# Patient Record
Sex: FEMALE | Race: WHITE | NOT HISPANIC OR LATINO
[De-identification: names, ages, dates, MRNs, and addresses within clinical notes are randomized per-mention and may not be internally consistent; named-entity substitution may affect disease eponyms.]

---

## 2020-11-17 ENCOUNTER — APPOINTMENT (OUTPATIENT)
Dept: GASTROENTEROLOGY | Facility: CLINIC | Age: 75
End: 2020-11-17
Payer: MEDICARE

## 2020-11-17 VITALS
HEART RATE: 75 BPM | TEMPERATURE: 98.3 F | DIASTOLIC BLOOD PRESSURE: 95 MMHG | SYSTOLIC BLOOD PRESSURE: 134 MMHG | HEIGHT: 65 IN | BODY MASS INDEX: 24.66 KG/M2 | WEIGHT: 148 LBS

## 2020-11-17 DIAGNOSIS — Z12.11 ENCOUNTER FOR SCREENING FOR MALIGNANT NEOPLASM OF COLON: ICD-10-CM

## 2020-11-17 PROCEDURE — 99202 OFFICE O/P NEW SF 15 MIN: CPT

## 2020-11-17 NOTE — HISTORY OF PRESENT ILLNESS
[FreeTextEntry1] : Patient who is enjoyed good health had her last colonoscopyapproximately 10 years ago, here for followup screening. She denies any rectal bleeding or anemia. She denies any change in bowel habits, a family history of colon cancer

## 2020-11-17 NOTE — PHYSICAL EXAM
[General Appearance - Alert] : alert [General Appearance - In No Acute Distress] : in no acute distress [Sclera] : the sclera and conjunctiva were normal [PERRL With Normal Accommodation] : pupils were equal in size, round, and reactive to light [Extraocular Movements] : extraocular movements were intact [Outer Ear] : the ears and nose were normal in appearance [Oropharynx] : the oropharynx was normal [Neck Appearance] : the appearance of the neck was normal [Neck Cervical Mass (___cm)] : no neck mass was observed [Jugular Venous Distention Increased] : there was no jugular-venous distention [Thyroid Diffuse Enlargement] : the thyroid was not enlarged [Thyroid Nodule] : there were no palpable thyroid nodules [Auscultation Breath Sounds / Voice Sounds] : lungs were clear to auscultation bilaterally [Heart Rate And Rhythm] : heart rate was normal and rhythm regular [Heart Sounds] : normal S1 and S2 [Heart Sounds Gallop] : no gallops [Murmurs] : no murmurs [Heart Sounds Pericardial Friction Rub] : no pericardial rub [Bowel Sounds] : normal bowel sounds [Abdomen Soft] : soft [Abdomen Tenderness] : non-tender [Abdomen Mass (___ Cm)] : no abdominal mass palpated [Cervical Lymph Nodes Enlarged Posterior Bilaterally] : posterior cervical [Cervical Lymph Nodes Enlarged Anterior Bilaterally] : anterior cervical [Supraclavicular Lymph Nodes Enlarged Bilaterally] : supraclavicular [Axillary Lymph Nodes Enlarged Bilaterally] : axillary [Femoral Lymph Nodes Enlarged Bilaterally] : femoral [Inguinal Lymph Nodes Enlarged Bilaterally] : inguinal [No CVA Tenderness] : no ~M costovertebral angle tenderness [No Spinal Tenderness] : no spinal tenderness [Abnormal Walk] : normal gait [Nail Clubbing] : no clubbing  or cyanosis of the fingernails [Musculoskeletal - Swelling] : no joint swelling seen [Motor Tone] : muscle strength and tone were normal [Skin Color & Pigmentation] : normal skin color and pigmentation [Skin Turgor] : normal skin turgor [] : no rash [Deep Tendon Reflexes (DTR)] : deep tendon reflexes were 2+ and symmetric [Sensation] : the sensory exam was normal to light touch and pinprick [No Focal Deficits] : no focal deficits

## 2020-12-18 ENCOUNTER — APPOINTMENT (OUTPATIENT)
Dept: DISASTER EMERGENCY | Facility: CLINIC | Age: 75
End: 2020-12-18

## 2020-12-18 ENCOUNTER — LABORATORY RESULT (OUTPATIENT)
Age: 75
End: 2020-12-18

## 2020-12-21 ENCOUNTER — RESULT REVIEW (OUTPATIENT)
Age: 75
End: 2020-12-21

## 2020-12-21 ENCOUNTER — APPOINTMENT (OUTPATIENT)
Dept: GASTROENTEROLOGY | Facility: AMBULATORY MEDICAL SERVICES | Age: 75
End: 2020-12-21
Payer: MEDICARE

## 2020-12-21 PROCEDURE — 45385 COLONOSCOPY W/LESION REMOVAL: CPT

## 2020-12-21 PROCEDURE — 45380 COLONOSCOPY AND BIOPSY: CPT | Mod: 59

## 2021-05-26 DIAGNOSIS — Z82.0 FAMILY HISTORY OF EPILEPSY AND OTHER DISEASES OF THE NERVOUS SYSTEM: ICD-10-CM

## 2021-05-26 DIAGNOSIS — J30.2 OTHER SEASONAL ALLERGIC RHINITIS: ICD-10-CM

## 2021-05-26 DIAGNOSIS — Z92.89 PERSONAL HISTORY OF OTHER MEDICAL TREATMENT: ICD-10-CM

## 2021-05-26 DIAGNOSIS — K63.5 POLYP OF COLON: ICD-10-CM

## 2021-05-26 DIAGNOSIS — H49.02 THIRD [OCULOMOTOR] NERVE PALSY, LEFT EYE: ICD-10-CM

## 2021-05-26 DIAGNOSIS — B00.9 HERPESVIRAL INFECTION, UNSPECIFIED: ICD-10-CM

## 2021-05-26 RX ORDER — NITROFURANTOIN (MONOHYDRATE/MACROCRYSTALS) 25; 75 MG/1; MG/1
100 CAPSULE ORAL
Qty: 60 | Refills: 0 | Status: DISCONTINUED | COMMUNITY
Start: 2020-09-11 | End: 2021-05-26

## 2021-07-18 PROBLEM — Z82.69 FAMILY HISTORY OF OSTEOARTHRITIS: Status: ACTIVE | Noted: 2021-07-18

## 2021-07-18 PROBLEM — M54.9 CHRONIC BACK PAIN: Status: ACTIVE | Noted: 2021-07-18

## 2021-07-18 PROBLEM — Z86.018 HISTORY OF INTRADUCTAL PAPILLOMA OF RIGHT BREAST: Status: RESOLVED | Noted: 2021-07-18 | Resolved: 2021-07-18

## 2021-07-18 PROBLEM — Z87.891 FORMER SMOKER: Status: ACTIVE | Noted: 2021-05-26

## 2021-07-18 PROBLEM — Z78.9 EXERCISES DAILY: Status: ACTIVE | Noted: 2021-05-26

## 2021-07-18 PROBLEM — Z78.9 REGULAR ALCOHOL CONSUMPTION: Status: ACTIVE | Noted: 2021-05-26

## 2021-07-18 RX ORDER — SODIUM SULFATE, POTASSIUM SULFATE, MAGNESIUM SULFATE 17.5; 3.13; 1.6 G/ML; G/ML; G/ML
17.5-3.13-1.6 SOLUTION, CONCENTRATE ORAL
Qty: 1 | Refills: 0 | Status: DISCONTINUED | COMMUNITY
Start: 2020-11-17 | End: 2021-07-18

## 2021-07-20 ENCOUNTER — APPOINTMENT (OUTPATIENT)
Dept: FAMILY MEDICINE | Facility: CLINIC | Age: 76
End: 2021-07-20
Payer: MEDICARE

## 2021-07-20 ENCOUNTER — LABORATORY RESULT (OUTPATIENT)
Age: 76
End: 2021-07-20

## 2021-07-20 VITALS
TEMPERATURE: 97.4 F | OXYGEN SATURATION: 98 % | HEIGHT: 65 IN | SYSTOLIC BLOOD PRESSURE: 136 MMHG | WEIGHT: 150 LBS | BODY MASS INDEX: 24.99 KG/M2 | HEART RATE: 74 BPM | DIASTOLIC BLOOD PRESSURE: 82 MMHG

## 2021-07-20 DIAGNOSIS — M54.9 DORSALGIA, UNSPECIFIED: ICD-10-CM

## 2021-07-20 DIAGNOSIS — Z78.9 OTHER SPECIFIED HEALTH STATUS: ICD-10-CM

## 2021-07-20 DIAGNOSIS — R03.0 ELEVATED BLOOD-PRESSURE READING, W/OUT DIAGNOSIS OF HYPERTENSION: ICD-10-CM

## 2021-07-20 DIAGNOSIS — Z82.69 FAMILY HISTORY OF OTHER DISEASES OF THE MUSCULOSKELETAL SYSTEM AND CONNECTIVE TISSUE: ICD-10-CM

## 2021-07-20 DIAGNOSIS — Z86.018 PERSONAL HISTORY OF OTHER BENIGN NEOPLASM: ICD-10-CM

## 2021-07-20 DIAGNOSIS — Z87.891 PERSONAL HISTORY OF NICOTINE DEPENDENCE: ICD-10-CM

## 2021-07-20 DIAGNOSIS — G89.29 DORSALGIA, UNSPECIFIED: ICD-10-CM

## 2021-07-20 PROCEDURE — G0438: CPT

## 2021-07-20 PROCEDURE — 36415 COLL VENOUS BLD VENIPUNCTURE: CPT

## 2021-07-20 NOTE — REVIEW OF SYSTEMS
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

## 2021-07-20 NOTE — PHYSICAL EXAM
[No Acute Distress] : no acute distress [Well Nourished] : well nourished [Well Developed] : well developed [Well-Appearing] : well-appearing [Normal Sclera/Conjunctiva] : normal sclera/conjunctiva [EOMI] : extraocular movements intact [No JVD] : no jugular venous distention [No Lymphadenopathy] : no lymphadenopathy [Supple] : supple [Thyroid Normal, No Nodules] : the thyroid was normal and there were no nodules present [No Respiratory Distress] : no respiratory distress  [No Accessory Muscle Use] : no accessory muscle use [Clear to Auscultation] : lungs were clear to auscultation bilaterally [Normal Rate] : normal rate  [Regular Rhythm] : with a regular rhythm [Normal S1, S2] : normal S1 and S2 [No Murmur] : no murmur heard [No Carotid Bruits] : no carotid bruits [No Varicosities] : no varicosities [Pedal Pulses Present] : the pedal pulses are present [No Edema] : there was no peripheral edema [No Extremity Clubbing/Cyanosis] : no extremity clubbing/cyanosis [Soft] : abdomen soft [Non Tender] : non-tender [Non-distended] : non-distended [No Masses] : no abdominal mass palpated [No HSM] : no HSM [Normal Bowel Sounds] : normal bowel sounds [Normal Posterior Cervical Nodes] : no posterior cervical lymphadenopathy [Normal Anterior Cervical Nodes] : no anterior cervical lymphadenopathy [No Joint Swelling] : no joint swelling [Grossly Normal Strength/Tone] : grossly normal strength/tone [No Rash] : no rash [Coordination Grossly Intact] : coordination grossly intact [No Focal Deficits] : no focal deficits [Normal Gait] : normal gait [Normal Affect] : the affect was normal [Normal Insight/Judgement] : insight and judgment were intact [de-identified] : slender frame [de-identified] : no s/sxs acute synovitis [de-identified] : +tanned skin with solar lentigos and freckles

## 2021-07-20 NOTE — HISTORY OF PRESENT ILLNESS
[de-identified] : Her last PE was 7/2020\par \par Her last tetanus shot was 2008\par Pneumovax, Prevnar-- has not had these vaccines\par Shingrix-- has not had this series\par Had COVID vaccine series\par \par Her last dentist visit was within past 6 months\par Her last eye doctor appointment was within past year-- will need cataract surgery in near future (Dr. Buitrago) \par Her last dermatologist visit was years ago and she defers on this for now as has no acute skin concerns. She will schedule if needed for any concerns/skin issues. \par \par Her diet is clean/healthful overall\par Exercises regularly (walking, yoga)\par \par Her last colonoscopy was 12/2020 polyp (Dr. Marroquin)\par Her last mammogram was 7/2020, has appt 8/2020\par Her last DEXA was in 2018, has appt 8/2020\par Her last gyn exam was 7/2020 (Dr. Espinosa), has appt 8/2020 \par \par She notes that she still gets hot flashes (milder and more tolerable) even with use of HT at this dose so she does not feel she should.can try to lower dose any further. We revd r/b/se HT and I recommended she work to wean down/off at this point in her life. She declines for now but says she d/w gyn periodically and will cont to do so \par \par She has chronic back pain due to DJD and herniated discs and also other OA related joint pain and stiffness. She manages these sxs by staying physically active and doing yoga and stretching regularly. Does not need to take any meds for her OA. \par

## 2021-07-20 NOTE — PLAN
[FreeTextEntry1] : Reviewed age-appropriate preventive screening tests with patient. UTD on colonoscopy and has gyn, mammo, DEXA exams schedule for 8/2021\par \par Revd/recommended Shingrix, Tdap or Td, Prevnar, Pneumovax. She is considering Shingrix in near future. \par \par Cont clean eating and regular exercise/staying as physically active as possible.\par \par Reviewed importance of good self care (eg meditation, yoga, adequate rest, regular exercise, magnesium, clean eating etc).\par \par Check labs today. She defers on ECG for today. BP mildly elevated in office today which she attributes to having to wear mask and being around others wearing masks as these factors tend to elicit feelings of claustrophobia for her. Her BPs are usually excellent and she can check occas at home and let me know if home BP is elevated. She will cont her healthful lifestyle.\par \par Next PE in 1-2 yrs.

## 2021-07-21 ENCOUNTER — NON-APPOINTMENT (OUTPATIENT)
Age: 76
End: 2021-07-21

## 2021-07-21 LAB
ALBUMIN SERPL ELPH-MCNC: 4.6 G/DL
ALP BLD-CCNC: 82 U/L
ALT SERPL-CCNC: 13 U/L
ANION GAP SERPL CALC-SCNC: 13 MMOL/L
AST SERPL-CCNC: 17 U/L
BILIRUB SERPL-MCNC: 0.3 MG/DL
BUN SERPL-MCNC: 15 MG/DL
CALCIUM SERPL-MCNC: 9.8 MG/DL
CHLORIDE SERPL-SCNC: 103 MMOL/L
CHOLEST SERPL-MCNC: 184 MG/DL
CO2 SERPL-SCNC: 24 MMOL/L
CREAT SERPL-MCNC: 0.69 MG/DL
ESTIMATED AVERAGE GLUCOSE: 103 MG/DL
GLUCOSE SERPL-MCNC: 90 MG/DL
HBA1C MFR BLD HPLC: 5.2 %
HDLC SERPL-MCNC: 48 MG/DL
LDLC SERPL CALC-MCNC: 107 MG/DL
NONHDLC SERPL-MCNC: 135 MG/DL
POTASSIUM SERPL-SCNC: 4.8 MMOL/L
PROT SERPL-MCNC: 6.8 G/DL
SODIUM SERPL-SCNC: 141 MMOL/L
TRIGL SERPL-MCNC: 143 MG/DL
TSH SERPL-ACNC: 5.06 UIU/ML

## 2022-01-12 ENCOUNTER — APPOINTMENT (OUTPATIENT)
Dept: FAMILY MEDICINE | Facility: CLINIC | Age: 77
End: 2022-01-12
Payer: MEDICARE

## 2022-01-12 VITALS
WEIGHT: 151 LBS | SYSTOLIC BLOOD PRESSURE: 118 MMHG | HEART RATE: 84 BPM | HEIGHT: 65 IN | TEMPERATURE: 96.8 F | OXYGEN SATURATION: 97 % | DIASTOLIC BLOOD PRESSURE: 70 MMHG | BODY MASS INDEX: 25.16 KG/M2

## 2022-01-12 PROCEDURE — 36415 COLL VENOUS BLD VENIPUNCTURE: CPT

## 2022-01-12 PROCEDURE — 99213 OFFICE O/P EST LOW 20 MIN: CPT | Mod: 25

## 2022-01-12 NOTE — HISTORY OF PRESENT ILLNESS
[FreeTextEntry1] : MALISSA MURCIA is a 76 year old female here for a follow up visit.\par  [de-identified] : Amy is here to f/u on abnormal thyroid levels in hypothyroidism ranges noted at her 7/2021 CPE visit (TSH 5.06, FT4 0.8). HDL was also borderline low at 48 at that time. \par \par She was offered A trial of low dose levothyroxine vs. continue clean eating and increase exercise levels and recheck TFTs. She opted to  defer on meds and is here for recheck of labs. She feels well and denies s/sxs hypothyroidism-- she has trouble losing wgt but that has been since menopause and wgt is overall stable over past few years. Feels well in general. No FH thyroid disease\par \par She notes that she still gets hot flashes (milder and more tolerable) even with use of HT at this dose so she does not feel she should.can try to lower dose any further. We revd r/b/se HT and I recommended she work to wean down/off at this point in her life. She declines for now but says she d/w gyn periodically and will cont to do so \par \par She has chronic back pain due to DJD and herniated discs and also other OA related joint pain and stiffness. She manages these sxs by staying physically active and doing yoga and stretching regularly. Does not need to take any meds for her OA. \par \par Her diet is clean/healthful overall. Walking/doing yoga regularly\par \par Had COVID booster dose. Prefers to not get flu vacc

## 2022-01-12 NOTE — PLAN
[FreeTextEntry1] : Check labs today (she prefers to stick with TSH and FT4 labs today which is reasonable). Revd prior thyroid results from 7/2021 and r/b/se levothyroxine and risks of untreated hypothyroidism and I suggested that if her labs are again in hypothyroid range she should strongly consider trial of thyroid medication replacement. If labs are improved to normal (or even subclinical hypothyroid range) then no need for thyroid med at this time. \par \par Revd/recommended Shingrix, Tdap or Td, Prevnar, Pneumovax. She is considering Shingrix in near future. \par \par Cont clean eating and regular exercise/staying as physically active as possible.\par \par Reviewed importance of good self care (eg meditation, yoga, adequate rest, regular exercise, magnesium, clean eating etc).\par \par BP was mildly elevated in office last visit but is wnl today and usually wnl. She will cont her healthful lifestyle.\par \par Next CPE due/scheduled in 7/2022

## 2022-01-12 NOTE — PHYSICAL EXAM
[No Acute Distress] : no acute distress [Well Nourished] : well nourished [Well-Appearing] : well-appearing [Well Developed] : well developed [Normal Sclera/Conjunctiva] : normal sclera/conjunctiva [EOMI] : extraocular movements intact [No JVD] : no jugular venous distention [No Lymphadenopathy] : no lymphadenopathy [Supple] : supple [Thyroid Normal, No Nodules] : the thyroid was normal and there were no nodules present [No Respiratory Distress] : no respiratory distress  [No Accessory Muscle Use] : no accessory muscle use [Clear to Auscultation] : lungs were clear to auscultation bilaterally [Normal Rate] : normal rate  [Regular Rhythm] : with a regular rhythm [Normal S1, S2] : normal S1 and S2 [No Murmur] : no murmur heard [Pedal Pulses Present] : the pedal pulses are present [No Edema] : there was no peripheral edema [No Extremity Clubbing/Cyanosis] : no extremity clubbing/cyanosis [Soft] : abdomen soft [Non Tender] : non-tender [Normal Bowel Sounds] : normal bowel sounds [Normal Posterior Cervical Nodes] : no posterior cervical lymphadenopathy [Normal Anterior Cervical Nodes] : no anterior cervical lymphadenopathy [No Joint Swelling] : no joint swelling [Grossly Normal Strength/Tone] : grossly normal strength/tone [No Rash] : no rash [Coordination Grossly Intact] : coordination grossly intact [No Focal Deficits] : no focal deficits [Normal Gait] : normal gait [Normal Affect] : the affect was normal [Normal Insight/Judgement] : insight and judgment were intact [de-identified] : no s/sxs acute synovitis [de-identified] : +tanned skin with solar lentigos and freckles

## 2022-01-12 NOTE — HEALTH RISK ASSESSMENT
[0] : 2) Feeling down, depressed, or hopeless: Not at all (0) [PHQ-2 Negative - No further assessment needed] : PHQ-2 Negative - No further assessment needed [[id number]] : 0

## 2022-01-12 NOTE — REVIEW OF SYSTEMS
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

## 2022-01-13 ENCOUNTER — NON-APPOINTMENT (OUTPATIENT)
Age: 77
End: 2022-01-13

## 2022-01-13 DIAGNOSIS — Z86.39 PERSONAL HISTORY OF OTHER ENDOCRINE, NUTRITIONAL AND METABOLIC DISEASE: ICD-10-CM

## 2022-01-13 LAB
T4 FREE SERPL-MCNC: 0.9 NG/DL
TSH SERPL-ACNC: 5.39 UIU/ML

## 2022-05-11 ENCOUNTER — NON-APPOINTMENT (OUTPATIENT)
Age: 77
End: 2022-05-11

## 2022-05-13 ENCOUNTER — APPOINTMENT (OUTPATIENT)
Dept: RADIOLOGY | Facility: CLINIC | Age: 77
End: 2022-05-13
Payer: MEDICARE

## 2022-05-13 ENCOUNTER — APPOINTMENT (OUTPATIENT)
Dept: FAMILY MEDICINE | Facility: CLINIC | Age: 77
End: 2022-05-13
Payer: MEDICARE

## 2022-05-13 ENCOUNTER — RESULT REVIEW (OUTPATIENT)
Age: 77
End: 2022-05-13

## 2022-05-13 ENCOUNTER — OUTPATIENT (OUTPATIENT)
Dept: OUTPATIENT SERVICES | Facility: HOSPITAL | Age: 77
LOS: 1 days | End: 2022-05-13
Payer: MEDICARE

## 2022-05-13 VITALS
HEART RATE: 80 BPM | HEIGHT: 65 IN | BODY MASS INDEX: 25.16 KG/M2 | TEMPERATURE: 97.1 F | SYSTOLIC BLOOD PRESSURE: 124 MMHG | DIASTOLIC BLOOD PRESSURE: 76 MMHG | WEIGHT: 151 LBS | OXYGEN SATURATION: 96 %

## 2022-05-13 DIAGNOSIS — M25.562 PAIN IN LEFT KNEE: ICD-10-CM

## 2022-05-13 PROCEDURE — 99213 OFFICE O/P EST LOW 20 MIN: CPT

## 2022-05-13 PROCEDURE — 73564 X-RAY EXAM KNEE 4 OR MORE: CPT

## 2022-05-13 PROCEDURE — 73564 X-RAY EXAM KNEE 4 OR MORE: CPT | Mod: 26,LT

## 2022-05-13 NOTE — HISTORY OF PRESENT ILLNESS
[FreeTextEntry8] : Patient is a 75yo female presenting to the office for evaluation of left knee pain.\par Pt states 7 weeks ago mechanical fall directly onto the left knee on grass in the garden.\par States ever since left knee has been bothering her, worst pain in the medial aspect of the left knee up into the distal thigh.\par Feels tightness over the left anterior knee.\par Pt able to ambulate without assistance, notices pain worse with climbing stairs.\par No prior history of injury and surgery to the left knee.\par Has been trying to rehab herself, yoga, etc.\par Has not yet been seen for issue --  fractured right hip a few days later s/p hip replacement, going through rehab, has not been able to get checked out herself because of 's issues.

## 2022-05-13 NOTE — PHYSICAL EXAM
[No JVD] : no jugular venous distention [Normal] : no rash [Coordination Grossly Intact] : coordination grossly intact [No Focal Deficits] : no focal deficits [Normal Gait] : normal gait [Normal Affect] : the affect was normal [Normal Insight/Judgement] : insight and judgment were intact [de-identified] : Left knee:  swelling of the entire knee joint worst in the medial aspect of the left knee, mild tenderness over area of swelling and up into the distal medial thigh.  Full ROM of the knee.  No joint laxity with valgus/varus stress or anterior/posterior drawer.  DP and PT pulses 2+ and strong.  Sensation intact.

## 2022-05-13 NOTE — ASSESSMENT
[FreeTextEntry1] : Patient is a 75yo female presenting to the office complaining of persistent left knee pain/swelling s/p mechanical fall with direct trauma to the anterior left knee approximately 7 weeks ago.  Ambulating without assistance.\par \par Left Knee Pain\par - X-ray left knee.\par - Schedule appointment with Orthopedist (pt's  sees Dr. Sanz, will call their office).\par - PT RX provided.\par - Rest, ice, compression, elevation.\par - Ibuprofen/Acetaminophen as needed for pain.\par - Alert office or go to ED if you develop any new, worsening or concerning symptoms including pain out of proportion, change in color/size/temperature of the leg, tenderness/pain/redness over the calf, inability to walk, or any other concerning symptoms.

## 2022-06-19 ENCOUNTER — FORM ENCOUNTER (OUTPATIENT)
Age: 77
End: 2022-06-19

## 2022-07-25 ENCOUNTER — APPOINTMENT (OUTPATIENT)
Dept: FAMILY MEDICINE | Facility: CLINIC | Age: 77
End: 2022-07-25

## 2022-07-25 VITALS
HEIGHT: 65 IN | WEIGHT: 146 LBS | OXYGEN SATURATION: 100 % | TEMPERATURE: 97.2 F | HEART RATE: 57 BPM | BODY MASS INDEX: 24.32 KG/M2 | SYSTOLIC BLOOD PRESSURE: 126 MMHG | DIASTOLIC BLOOD PRESSURE: 80 MMHG

## 2022-07-25 DIAGNOSIS — M25.562 PAIN IN LEFT KNEE: ICD-10-CM

## 2022-07-25 PROCEDURE — G0439: CPT

## 2022-07-25 PROCEDURE — 36415 COLL VENOUS BLD VENIPUNCTURE: CPT

## 2022-07-25 NOTE — HISTORY OF PRESENT ILLNESS
[de-identified] : Her last physical exam was last year\par \par Vaccines:\par Tetanus is NOT up to date, 2008\par Pneumococcal vaccination is NOT up to date\par Shingrix is NOT up to date\par COVID vaccine is up to date\par \par Her last dentist visit was less than one year ago\par Her last eye doctor appointment was within past year (), Will be having a laser procedure (and will need steroid eye gtts priro to this so has Rx) with Dr. Buitrago in August 2022 to correct retinal position. Has cataracts but are early stage enough that do not yet need surgery. \par \par Her last dermatologist visit was years ago\par \par GYN visit is up to date (last was 7/2021; ), has appt in near future \par Mammogram is up to date (last was 7/2021), has appt in near future \par Colon cancer screening is up to date, colonoscopy 12/2020 polyp, Dr. Marroquin\par DEXA is NOT up to date (last in 2018 wnl per pt)\par \par Her diet is healthy overall\par Exercise: daily walking, yoga and is also doing PT right now for her L knee\par \par Amy has h/o subclinical hypothyroidism, OA, IFG, HSV 2. Also HDL was mildly low at 2021 CPE visit which is not typical for her. \par \par She notes that she still gets hot flashes (milder and more tolerable), hannah in summer months, even with use of HT at this dose so she does not feel she should/can try to lower dose any further. We revd r/b/se HT and I recommended she work to wean down/off at this point in her life. She declines for now but says she d/w gyn periodically and will cont to do so \par \par She has chronic back pain due to DJD and herniated discs and also other OA related joint pain and stiffness. She manages these sxs by staying physically active and doing yoga and stretching regularly. Does not need to take any meds for her OA. \par \par She has been doing PT for L knee pain s/p fall with [facility] and is helping signif so would like to continue same and needs new PT Rx

## 2022-07-25 NOTE — HEALTH RISK ASSESSMENT
[0] : 2) Feeling down, depressed, or hopeless: Not at all (0) [PHQ-2 Negative - No further assessment needed] : PHQ-2 Negative - No further assessment needed [[id number]] : 0

## 2022-07-25 NOTE — PHYSICAL EXAM
[No Acute Distress] : no acute distress [Well Nourished] : well nourished [Well Developed] : well developed [Well-Appearing] : well-appearing [Normal Sclera/Conjunctiva] : normal sclera/conjunctiva [EOMI] : extraocular movements intact [No JVD] : no jugular venous distention [No Lymphadenopathy] : no lymphadenopathy [Supple] : supple [Thyroid Normal, No Nodules] : the thyroid was normal and there were no nodules present [No Respiratory Distress] : no respiratory distress  [No Accessory Muscle Use] : no accessory muscle use [Clear to Auscultation] : lungs were clear to auscultation bilaterally [Normal Rate] : normal rate  [Regular Rhythm] : with a regular rhythm [Normal S1, S2] : normal S1 and S2 [No Murmur] : no murmur heard [Pedal Pulses Present] : the pedal pulses are present [No Edema] : there was no peripheral edema [No Extremity Clubbing/Cyanosis] : no extremity clubbing/cyanosis [Soft] : abdomen soft [Non Tender] : non-tender [Normal Posterior Cervical Nodes] : no posterior cervical lymphadenopathy [Normal Bowel Sounds] : normal bowel sounds [Normal Anterior Cervical Nodes] : no anterior cervical lymphadenopathy [No Joint Swelling] : no joint swelling [Grossly Normal Strength/Tone] : grossly normal strength/tone [No Rash] : no rash [Coordination Grossly Intact] : coordination grossly intact [No Focal Deficits] : no focal deficits [Normal Gait] : normal gait [Normal Affect] : the affect was normal [Normal Insight/Judgement] : insight and judgment were intact [de-identified] : no s/sxs acute synovitis, L patella with new bony prominence from fall a few months ago (revd this is likely chronic) but no edema [de-identified] : +tanned skin with solar lentigos and freckles

## 2022-07-25 NOTE — PLAN
[FreeTextEntry1] : Continue all medications as prescribed. Check labs as above. Will adjust any medications based upon lab results.\par \par ECG not able to be done today due to technical issues in new office. She feels well without any cardiac sxs\par \par Reviewed age-appropriate preventive screening tests with patient. UTD on CRC screening, gyn and mammogram. She is due again soon for gyn exam, mammogram and is due for DEXA and has appts scheduled for near future. \par \par Revd/recommended Shingrix, Tdap and Prevnar 20 and she will consider\par \par Discussed clean eating (e.g. Mediterranean style diet) and recommendations for regular exercise/staying as physically active as possible.\par \par Recommended Tylenol XS or Arthritis 1-2 pills BID-TID if helpful, ok to use NSAIDs sparingly with food (but revd r/b/se of NSAIDs incl CV, renal and GI and she should limit use as much as possible), regular stretching, heat/ice prn, consider turmeric supplementation, consider CBD cream or oral options, gentle yoga/chair yoga, Pilates, strengthen core muscles, consider chiro and/or massage and/or acupuncture. If these measures are not helpful enough then consider consultation with ortho and/or pain  for further eval and treatment. \par \par PT Rx renewed for her L knee\par \par I asked Amy to d/w gyn re option to wean HT at some point (perhaps in colder weather season) to see if she can get by without HT at this point as has been on HT for >20-25 yrs and I am concerned about risks associated with long term HT use. \par \par Reviewed importance of good self care (e.g. meditation, yoga, adequate rest, regular exercise, magnesium, clean eating, etc.).\par \par Follow up for next physical in one year.

## 2022-07-25 NOTE — ASSESSMENT
[FreeTextEntry1] : MALISSA MURCIA is a 76 year old female here for a physical exam.  She is also here to follow up on medical issues as noted above.\par \par Patient has a history of osteoarthritis, herpes simplex virus 2 infection, impaired fasting glucose, and subclinical hypothyroidism. She also has L knee pain s/p fall a few months ago that is improving signif with time and PT

## 2022-07-25 NOTE — REVIEW OF SYSTEMS
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

## 2022-07-26 LAB
ALBUMIN SERPL ELPH-MCNC: 4.5 G/DL
ALP BLD-CCNC: 68 U/L
ALT SERPL-CCNC: 15 U/L
ANION GAP SERPL CALC-SCNC: 10 MMOL/L
AST SERPL-CCNC: 20 U/L
BILIRUB SERPL-MCNC: 0.3 MG/DL
BUN SERPL-MCNC: 18 MG/DL
CALCIUM SERPL-MCNC: 9.5 MG/DL
CHLORIDE SERPL-SCNC: 106 MMOL/L
CHOLEST SERPL-MCNC: 187 MG/DL
CO2 SERPL-SCNC: 26 MMOL/L
CREAT SERPL-MCNC: 0.67 MG/DL
EGFR: 91 ML/MIN/1.73M2
ESTIMATED AVERAGE GLUCOSE: 111 MG/DL
GLUCOSE SERPL-MCNC: 92 MG/DL
HBA1C MFR BLD HPLC: 5.5 %
HDLC SERPL-MCNC: 46 MG/DL
LDLC SERPL CALC-MCNC: 117 MG/DL
NONHDLC SERPL-MCNC: 141 MG/DL
POTASSIUM SERPL-SCNC: 4.3 MMOL/L
PROT SERPL-MCNC: 6.8 G/DL
SODIUM SERPL-SCNC: 142 MMOL/L
TRIGL SERPL-MCNC: 122 MG/DL
TSH SERPL-ACNC: 4.13 UIU/ML

## 2022-07-29 ENCOUNTER — NON-APPOINTMENT (OUTPATIENT)
Age: 77
End: 2022-07-29

## 2022-08-14 ENCOUNTER — FORM ENCOUNTER (OUTPATIENT)
Age: 77
End: 2022-08-14

## 2022-08-15 ENCOUNTER — NON-APPOINTMENT (OUTPATIENT)
Age: 77
End: 2022-08-15

## 2022-08-21 ENCOUNTER — FORM ENCOUNTER (OUTPATIENT)
Age: 77
End: 2022-08-21

## 2022-08-23 ENCOUNTER — NON-APPOINTMENT (OUTPATIENT)
Age: 77
End: 2022-08-23

## 2022-08-24 ENCOUNTER — APPOINTMENT (OUTPATIENT)
Dept: NUCLEAR MEDICINE | Facility: CLINIC | Age: 77
End: 2022-08-24

## 2022-08-24 ENCOUNTER — OUTPATIENT (OUTPATIENT)
Dept: OUTPATIENT SERVICES | Facility: HOSPITAL | Age: 77
LOS: 1 days | End: 2022-08-24
Payer: MEDICARE

## 2022-08-24 DIAGNOSIS — C53.0 MALIGNANT NEOPLASM OF ENDOCERVIX: ICD-10-CM

## 2022-08-24 PROCEDURE — 78815 PET IMAGE W/CT SKULL-THIGH: CPT | Mod: 26,PI,MH

## 2022-08-24 PROCEDURE — A9552: CPT

## 2022-08-24 PROCEDURE — 78815 PET IMAGE W/CT SKULL-THIGH: CPT

## 2022-08-30 ENCOUNTER — APPOINTMENT (OUTPATIENT)
Dept: GYNECOLOGIC ONCOLOGY | Facility: CLINIC | Age: 77
End: 2022-08-30

## 2022-08-30 ENCOUNTER — NON-APPOINTMENT (OUTPATIENT)
Age: 77
End: 2022-08-30

## 2022-08-30 VITALS
DIASTOLIC BLOOD PRESSURE: 90 MMHG | WEIGHT: 145 LBS | HEART RATE: 80 BPM | HEIGHT: 65 IN | BODY MASS INDEX: 24.16 KG/M2 | SYSTOLIC BLOOD PRESSURE: 158 MMHG

## 2022-08-30 DIAGNOSIS — Z76.89 PERSONS ENCOUNTERING HEALTH SERVICES IN OTHER SPECIFIED CIRCUMSTANCES: ICD-10-CM

## 2022-08-30 DIAGNOSIS — Z80.0 FAMILY HISTORY OF MALIGNANT NEOPLASM OF DIGESTIVE ORGANS: ICD-10-CM

## 2022-08-30 PROCEDURE — 99205 OFFICE O/P NEW HI 60 MIN: CPT

## 2022-08-31 ENCOUNTER — OUTPATIENT (OUTPATIENT)
Dept: OUTPATIENT SERVICES | Facility: HOSPITAL | Age: 77
LOS: 1 days | End: 2022-08-31
Payer: MEDICARE

## 2022-08-31 ENCOUNTER — NON-APPOINTMENT (OUTPATIENT)
Age: 77
End: 2022-08-31

## 2022-08-31 DIAGNOSIS — C80.1 MALIGNANT (PRIMARY) NEOPLASM, UNSPECIFIED: ICD-10-CM

## 2022-09-01 ENCOUNTER — RESULT REVIEW (OUTPATIENT)
Age: 77
End: 2022-09-01

## 2022-09-01 PROCEDURE — 88321 CONSLTJ&REPRT SLD PREP ELSWR: CPT

## 2022-09-01 PROCEDURE — 88321 CONSLTJ&REPRT SLD PREP ELSWR: CPT | Mod: 59

## 2022-09-05 ENCOUNTER — FORM ENCOUNTER (OUTPATIENT)
Age: 77
End: 2022-09-05

## 2022-09-06 LAB — SURGICAL PATHOLOGY STUDY: SIGNIFICANT CHANGE UP

## 2022-09-12 ENCOUNTER — OUTPATIENT (OUTPATIENT)
Dept: OUTPATIENT SERVICES | Facility: HOSPITAL | Age: 77
LOS: 1 days | End: 2022-09-12
Payer: MEDICARE

## 2022-09-12 ENCOUNTER — APPOINTMENT (OUTPATIENT)
Dept: MRI IMAGING | Facility: CLINIC | Age: 77
End: 2022-09-12

## 2022-09-12 DIAGNOSIS — C53.0 MALIGNANT NEOPLASM OF ENDOCERVIX: ICD-10-CM

## 2022-09-12 PROCEDURE — A9585: CPT

## 2022-09-12 PROCEDURE — 72197 MRI PELVIS W/O & W/DYE: CPT | Mod: 26,MH

## 2022-09-12 PROCEDURE — 72197 MRI PELVIS W/O & W/DYE: CPT

## 2022-09-18 ENCOUNTER — OUTPATIENT (OUTPATIENT)
Dept: OUTPATIENT SERVICES | Facility: HOSPITAL | Age: 77
LOS: 1 days | Discharge: HOME | End: 2022-09-18

## 2022-09-18 DIAGNOSIS — C54.1 MALIGNANT NEOPLASM OF ENDOMETRIUM: ICD-10-CM

## 2022-09-23 ENCOUNTER — RESULT REVIEW (OUTPATIENT)
Age: 77
End: 2022-09-23

## 2022-09-23 ENCOUNTER — APPOINTMENT (OUTPATIENT)
Dept: HEMATOLOGY ONCOLOGY | Facility: CLINIC | Age: 77
End: 2022-09-23

## 2022-09-23 ENCOUNTER — APPOINTMENT (OUTPATIENT)
Dept: RADIATION ONCOLOGY | Facility: CLINIC | Age: 77
End: 2022-09-23

## 2022-09-23 VITALS
RESPIRATION RATE: 18 BRPM | BODY MASS INDEX: 24.46 KG/M2 | WEIGHT: 143.31 LBS | DIASTOLIC BLOOD PRESSURE: 81 MMHG | HEART RATE: 77 BPM | TEMPERATURE: 97.8 F | OXYGEN SATURATION: 96 % | SYSTOLIC BLOOD PRESSURE: 140 MMHG | HEIGHT: 64 IN

## 2022-09-23 VITALS
OXYGEN SATURATION: 97 % | RESPIRATION RATE: 17 BRPM | HEIGHT: 65 IN | BODY MASS INDEX: 24.16 KG/M2 | SYSTOLIC BLOOD PRESSURE: 124 MMHG | DIASTOLIC BLOOD PRESSURE: 82 MMHG | HEART RATE: 72 BPM | WEIGHT: 145 LBS

## 2022-09-23 LAB
ALBUMIN SERPL ELPH-MCNC: 4.6 G/DL — SIGNIFICANT CHANGE UP (ref 3.3–5)
ALP SERPL-CCNC: 75 U/L — SIGNIFICANT CHANGE UP (ref 40–120)
ALT FLD-CCNC: 13 U/L — SIGNIFICANT CHANGE UP (ref 10–45)
ANION GAP SERPL CALC-SCNC: 12 MMOL/L — SIGNIFICANT CHANGE UP (ref 5–17)
AST SERPL-CCNC: 16 U/L — SIGNIFICANT CHANGE UP (ref 10–40)
BASOPHILS # BLD AUTO: 0.03 K/UL — SIGNIFICANT CHANGE UP (ref 0–0.2)
BASOPHILS NFR BLD AUTO: 0.4 % — SIGNIFICANT CHANGE UP (ref 0–2)
BILIRUB SERPL-MCNC: 0.4 MG/DL — SIGNIFICANT CHANGE UP (ref 0.2–1.2)
BUN SERPL-MCNC: 13 MG/DL — SIGNIFICANT CHANGE UP (ref 7–23)
CALCIUM SERPL-MCNC: 9.8 MG/DL — SIGNIFICANT CHANGE UP (ref 8.4–10.5)
CHLORIDE SERPL-SCNC: 104 MMOL/L — SIGNIFICANT CHANGE UP (ref 96–108)
CO2 SERPL-SCNC: 26 MMOL/L — SIGNIFICANT CHANGE UP (ref 22–31)
CREAT SERPL-MCNC: 0.68 MG/DL — SIGNIFICANT CHANGE UP (ref 0.5–1.3)
EGFR: 90 ML/MIN/1.73M2 — SIGNIFICANT CHANGE UP
EOSINOPHIL # BLD AUTO: 0.13 K/UL — SIGNIFICANT CHANGE UP (ref 0–0.5)
EOSINOPHIL NFR BLD AUTO: 1.8 % — SIGNIFICANT CHANGE UP (ref 0–6)
GLUCOSE SERPL-MCNC: 98 MG/DL — SIGNIFICANT CHANGE UP (ref 70–99)
HCT VFR BLD CALC: 43.9 % — SIGNIFICANT CHANGE UP (ref 34.5–45)
HGB BLD-MCNC: 14.9 G/DL — SIGNIFICANT CHANGE UP (ref 11.5–15.5)
IMM GRANULOCYTES NFR BLD AUTO: 0.1 % — SIGNIFICANT CHANGE UP (ref 0–0.9)
INR BLD: 0.91 RATIO — SIGNIFICANT CHANGE UP (ref 0.88–1.16)
LYMPHOCYTES # BLD AUTO: 2.14 K/UL — SIGNIFICANT CHANGE UP (ref 1–3.3)
LYMPHOCYTES # BLD AUTO: 29.2 % — SIGNIFICANT CHANGE UP (ref 13–44)
MAGNESIUM SERPL-MCNC: 2.1 MG/DL — SIGNIFICANT CHANGE UP (ref 1.6–2.6)
MCHC RBC-ENTMCNC: 29.9 PG — SIGNIFICANT CHANGE UP (ref 27–34)
MCHC RBC-ENTMCNC: 33.9 GM/DL — SIGNIFICANT CHANGE UP (ref 32–36)
MCV RBC AUTO: 88 FL — SIGNIFICANT CHANGE UP (ref 80–100)
MONOCYTES # BLD AUTO: 0.48 K/UL — SIGNIFICANT CHANGE UP (ref 0–0.9)
MONOCYTES NFR BLD AUTO: 6.6 % — SIGNIFICANT CHANGE UP (ref 2–14)
NEUTROPHILS # BLD AUTO: 4.53 K/UL — SIGNIFICANT CHANGE UP (ref 1.8–7.4)
NEUTROPHILS NFR BLD AUTO: 61.9 % — SIGNIFICANT CHANGE UP (ref 43–77)
NRBC # BLD: 0 /100 WBCS — SIGNIFICANT CHANGE UP (ref 0–0)
PLATELET # BLD AUTO: 209 K/UL — SIGNIFICANT CHANGE UP (ref 150–400)
POTASSIUM SERPL-MCNC: 4.5 MMOL/L — SIGNIFICANT CHANGE UP (ref 3.5–5.3)
POTASSIUM SERPL-SCNC: 4.5 MMOL/L — SIGNIFICANT CHANGE UP (ref 3.5–5.3)
PROT SERPL-MCNC: 6.8 G/DL — SIGNIFICANT CHANGE UP (ref 6–8.3)
PROTHROM AB SERPL-ACNC: 10.6 SEC — SIGNIFICANT CHANGE UP (ref 10.5–13.4)
RBC # BLD: 4.99 M/UL — SIGNIFICANT CHANGE UP (ref 3.8–5.2)
RBC # FLD: 13.1 % — SIGNIFICANT CHANGE UP (ref 10.3–14.5)
SODIUM SERPL-SCNC: 142 MMOL/L — SIGNIFICANT CHANGE UP (ref 135–145)
WBC # BLD: 7.32 K/UL — SIGNIFICANT CHANGE UP (ref 3.8–10.5)
WBC # FLD AUTO: 7.32 K/UL — SIGNIFICANT CHANGE UP (ref 3.8–10.5)

## 2022-09-23 PROCEDURE — 77263 THER RADIOLOGY TX PLNG CPLX: CPT

## 2022-09-23 PROCEDURE — 99204 OFFICE O/P NEW MOD 45 MIN: CPT | Mod: 25

## 2022-09-23 PROCEDURE — 99205 OFFICE O/P NEW HI 60 MIN: CPT

## 2022-09-23 RX ORDER — CALCIUM CARBONATE/VITAMIN D3 600 MG-20
TABLET ORAL
Refills: 0 | Status: DISCONTINUED | COMMUNITY
End: 2022-09-23

## 2022-09-23 RX ORDER — PREDNISOLONE ACETATE 10 MG/ML
1 SUSPENSION/ DROPS OPHTHALMIC
Qty: 5 | Refills: 0 | Status: DISCONTINUED | COMMUNITY
Start: 2022-06-20 | End: 2022-09-23

## 2022-09-23 NOTE — HISTORY OF PRESENT ILLNESS
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 Schmidt.\par \par ECC and biopsy 9/15/2021  showed chronic and acute inflammation, reactive atypia, no evidence of dysplasia.\par \par She had an NITIN pap 8/3/21 and follow up colposcopy with biopsies  was negative for dysplasia or cancer.  \par  Pap on 8/8/22 again noted NITIN, favoring neoplasia. HPV negative.\par  8/8/22 Colposcopy with biopsies demonstrated well differentiated endocervical adenocarcinoma on the ECC.  \par \par She denies bleeding or pain and has always worn a tampon and continues to wear one for "cleanliness reasons."   She also takes Premarin PO for hot flashes.\par \par 8/8/22 Colposcopy ([facility]) - task sent for [facility] path review 8/30/22 \par Cervix:  12 o'clock:  atypical endocervical epithelium, squamous mucosa with reactive atypia.  \par ECC:  endocervical adenocarcinoma, well differentiated.  Tumor cells positive for P16 and Ki-67 was increased.  ER and AZ were negative.  \par \par 8/24/22 PET CT \par 1.  Hypermetabolic soft tissue mass arising from the LEFT portion of the vaginal cuff, consistent with malignancy.\par 2.  Nonspecific findings in the skeleton; there is an area of non--periarticular uptake within L1 where malignancy is not excluded. Remaining findings are indeterminate but are favored to be degenerative. MRI may be helpful.\par 3.  Micronodularity along the LEFT oblique fissure, likely inflammatory.\par \par MRI pelvis 9/12/22 In a patient with positive biopsy of the cervical stump there appears be prominent cervix with cystic change without measurable mass. There is no evidence of extension into surrounding parametrium. No adenopathy can be seen.\par \par She reports chronic low back pain and presents today to discuss the role of radiotherapy in her care.

## 2022-09-23 NOTE — OB/GYN HISTORY
[History of Birth Control Pills] : Patient has a history of taking birth control pills [History of Hormone Replacement Therapy] : a history of hormone replacement therapy [Menopause Age: ____] : patient was [unfilled] years old at menopause [___] : Living: [unfilled]

## 2022-09-23 NOTE — PHYSICAL EXAM
[Bowel Sounds] : normal bowel sounds [Abdomen Soft] : soft [Nondistended] : nondistended [Normal External Genitalia] : normal external genitalia  [Femoral Lymph Nodes Enlarged Bilaterally] : femoral [Inguinal Lymph Nodes Enlarged Bilaterally] : inguinal [Normal] : oriented to person, place and time, the affect was normal, the mood was normal and not anxious [de-identified] : lower pelvic c section/hyst scar [de-identified] : L sided nodular mass palpated at L vag cuff, erythematous, not bleeding

## 2022-09-23 NOTE — VITALS
[Maximal Pain Intensity: 0/10] : 0/10 [NoTreatment Scheduled] : no treatment scheduled [Date: ____________] : Patient's last distress assessment performed on [unfilled]. [4 - Distress Level] : Distress Level: 4 [80: Normal activity with effort; some signs or symptoms of disease.] : 80: Normal activity with effort; some signs or symptoms of disease.

## 2022-09-23 NOTE — REVIEW OF SYSTEMS
[Joint Pain] : joint pain [Muscle Pain] : muscle pain [Anxiety] : anxiety [Negative] : Allergic/Immunologic [de-identified] : can not wear a mask

## 2022-09-24 LAB
HAV IGM SER-ACNC: SIGNIFICANT CHANGE UP
HBV CORE IGM SER-ACNC: SIGNIFICANT CHANGE UP
HCV AB S/CO SERPL IA: 0.1 S/CO — SIGNIFICANT CHANGE UP (ref 0–0.99)
HCV AB SERPL-IMP: SIGNIFICANT CHANGE UP

## 2022-09-25 LAB — HBV SURFACE AG SER-ACNC: SIGNIFICANT CHANGE UP

## 2022-09-27 ENCOUNTER — NON-APPOINTMENT (OUTPATIENT)
Age: 77
End: 2022-09-27

## 2022-09-27 DIAGNOSIS — Z86.69 PERSONAL HISTORY OF OTHER DISEASES OF THE NERVOUS SYSTEM AND SENSE ORGANS: ICD-10-CM

## 2022-09-27 DIAGNOSIS — C53.9 MALIGNANT NEOPLASM OF CERVIX UTERI, UNSPECIFIED: ICD-10-CM

## 2022-09-27 NOTE — PHYSICAL EXAM
[Restricted in physically strenuous activity but ambulatory and able to carry out work of a light or sedentary nature] : Status 1- Restricted in physically strenuous activity but ambulatory and able to carry out work of a light or sedentary nature, e.g., light house work, office work [Normal] : grossly intact [de-identified] : fear of claustrophobia, asphyxiation

## 2022-09-27 NOTE — CONSULT LETTER
[Dear  ___] : Dear  [unfilled], [Consult Letter:] : I had the pleasure of evaluating your patient, [unfilled]. [Please see my note below.] : Please see my note below. [Consult Closing:] : Thank you very much for allowing me to participate in the care of this patient.  If you have any questions, please do not hesitate to contact me. [Sincerely,] : Sincerely, [DrRajesh  ___] : Dr. KRUGER [DrRajesh ___] : Dr. KRUGER [FreeTextEntry3] : Dr. Alicia Matos

## 2022-09-27 NOTE — RESULTS/DATA
[FreeTextEntry1] : 9/12/22 MRI Pelvis: In a patient with positive biopsy of the cervical stump there appears be prominent cervix with cystic change without measurable mass. There is no evidence of extension into surrounding parametrium. No adenopathy can be seen.\par \par 9/1/22 Path: PAP: Cervical epithelial cell abnormality. Atypical glandular cells present, favor neoplastic.\par \par 8/24/22 PET: Hypermetabolic soft tissue mass arising from the LEFT portion of the vaginal cuff, consistent with malignancy. Nonspecific findings in the skeleton; there is an area of non--periarticular uptake within L1 where malignancy is not excluded. Remaining findings are indeterminate but are favored to be degenerative. Micronodularity along the LEFT oblique fissure, likely inflammatory.\par Addendum: \par Patient was seen by the referring service on 8/30/2022. Patient was confirmed on physical examination to have a residual cervical stump after supracervical hysterectomy, clinically consistent with an endocervical tumor (?barrel cervix?) on exam, as well as being biopsy-proven. The hypermetabolic soft tissue mass described in the original report is therefore not arising from the vaginal cuff but in fact from the cervical stump. No other changes to original report.\par \par 8/8/22 Path: Cervical biopsy: Atypical endocervical epithelium. Squamous mucosa with reactive atypia. See part B. \par Part B: Endocervical adenocarcinoma, well-differentiated. Tumor cells positive for P16 and Ki-67 was increased. Er and [place] were negative.

## 2022-09-27 NOTE — REVIEW OF SYSTEMS
[Anxiety] : anxiety [Negative] : Allergic/Immunologic [Chest Pain] : no chest pain [Shortness Of Breath] : no shortness of breath [Abdominal Pain] : no abdominal pain [de-identified] : fear of asphyxiation

## 2022-09-27 NOTE — ASSESSMENT
[FreeTextEntry1] : For patients with early-stage cervical cancer treated with a primary surgical approach, adjuvant therapy should be administered if final pathologic findings suggest they are at risk for disease recurrence. Pelvic EBRT is recommended for patients with stage IA2, IB, or IIA1 disease who have negative lymph nodes after surgery but have large primary tumors, deep stromal invasion, or lymphovascular invasion.  Potentially important risk factors for recurrence are not limited to Sedlis Criteria.  Additional risk factors include adenocarcinoma component and close or positive surgical margins.  Postoperative pelvic EBRT with concurrent platinum containing chemotherapy with or without vaginal brachytherapy is recommended for patients with positive pelvic nodes, positive surgical margin and/or positive parametrium.  These patients are considered to have high risk disease. Adjuvant concurrent chemoradiation significantly improved overall survival for patients with high risk early stage disease who undergo radical hysterectomy and pelvic lymphadenectomy. The intergroup trial 0107/ showed a statistically significant benefit of adjuvant pelvic radiation with concurrent cisplatin and 5-FU in the treatment of patients with high-risk features above.  \par \par

## 2022-09-27 NOTE — HISTORY OF PRESENT ILLNESS
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 She had a colposcopy on 8/8/22 and pathology showed atypical endocervical epithelium. Squamous mucosa with reactive atypia. See part B. Part B: Endocervical adenocarcinoma, well-differentiated. Tumor cells positive for P16 and Ki-67 was increased. Er and [place] were negative. She had a PET on 8/24/22 which showed hypermetabolic soft tissue mass arising from the LEFT portion of the vaginal cuff, consistent with malignancy. Nonspecific findings in the skeleton; there is an area of non--periarticular uptake within L1 where malignancy is not excluded. Remaining findings are indeterminate but are favored to be degenerative. Micronodularity along the LEFT oblique fissure, likely inflammatory.\par Addendum: \par Patient was seen by the referring service on 8/30/2022. Patient was confirmed on physical examination to have a residual cervical stump after supracervical hysterectomy, clinically consistent with an endocervical tumor (?barrel cervix?) on exam, as well as being biopsy-proven. The hypermetabolic soft tissue mass described in the original report is therefore not arising from the vaginal cuff but in fact from the cervical stump. No other changes to original report. \par Consult slides from 9/1/22 showed a pathology of cervical epithelial cell abnormality. Atypical glandular cells present, favor neoplastic. On 9/12/22 an MRI pelvis showed in a patient with positive biopsy of the cervical stump there appears be prominent cervix with cystic change without measurable mass. There is no evidence of extension into surrounding parametrium. No adenopathy can be seen. [de-identified] : PMH: Bell's Palsy 2018 (resolved but has hyperacute hearing bilaterally); selective claustrophobia and phobia of asphyxiation which affects her ability to wear a standard mask. \par PSH: tonsillectomy (age 19);  (); tubal ligation with TOP (); supracervical hysterectomy BSO for fibroids (); varicose vein ligation (); blepharoplasty (); right breast biopsy / FNA - benign (cannot recall year). \par Family Hx: Mother breast ca, 64 yo; Father abdominal ca, 68 yo; Brother pancreatic ca, 67; maternal aunt breast ca; maternal cousin breast ca  26 yo \par Social Hx: smoker for 15 years, quit at 32 yo; ETOH wine 2-3 oz once a day; retired, retail and real estate; , lives with her ; family close by  [de-identified] : Denies vaginal pain or bleeding. She feels the best shes felt for years. Patient is on Premarin for ~ 20 years to manage hot flashes.  See full review of symptoms below.

## 2022-09-28 ENCOUNTER — NON-APPOINTMENT (OUTPATIENT)
Age: 77
End: 2022-09-28

## 2022-09-28 ENCOUNTER — APPOINTMENT (OUTPATIENT)
Dept: HEMATOLOGY ONCOLOGY | Facility: CLINIC | Age: 77
End: 2022-09-28

## 2022-09-28 PROCEDURE — 77332 RADIATION TREATMENT AID(S): CPT

## 2022-10-04 PROCEDURE — 77338 DESIGN MLC DEVICE FOR IMRT: CPT

## 2022-10-04 PROCEDURE — 77300 RADIATION THERAPY DOSE PLAN: CPT

## 2022-10-04 PROCEDURE — 77301 RADIOTHERAPY DOSE PLAN IMRT: CPT

## 2022-10-10 ENCOUNTER — RESULT CHARGE (OUTPATIENT)
Age: 77
End: 2022-10-10

## 2022-10-11 ENCOUNTER — APPOINTMENT (OUTPATIENT)
Dept: HEMATOLOGY ONCOLOGY | Facility: CLINIC | Age: 77
End: 2022-10-11

## 2022-10-11 PROCEDURE — 93010 ELECTROCARDIOGRAM REPORT: CPT

## 2022-10-12 ENCOUNTER — NON-APPOINTMENT (OUTPATIENT)
Age: 77
End: 2022-10-12

## 2022-10-12 ENCOUNTER — APPOINTMENT (OUTPATIENT)
Dept: RADIATION ONCOLOGY | Facility: CLINIC | Age: 77
End: 2022-10-12

## 2022-10-12 PROCEDURE — 77427 RADIATION TX MANAGEMENT X5: CPT

## 2022-10-12 PROCEDURE — G6002: CPT

## 2022-10-12 PROCEDURE — G6015: CPT

## 2022-10-13 ENCOUNTER — RESULT REVIEW (OUTPATIENT)
Age: 77
End: 2022-10-13

## 2022-10-13 ENCOUNTER — APPOINTMENT (OUTPATIENT)
Dept: INFUSION THERAPY | Facility: CLINIC | Age: 77
End: 2022-10-13

## 2022-10-13 VITALS
TEMPERATURE: 97 F | SYSTOLIC BLOOD PRESSURE: 124 MMHG | HEIGHT: 64 IN | RESPIRATION RATE: 17 BRPM | WEIGHT: 143.06 LBS | BODY MASS INDEX: 24.42 KG/M2 | HEART RATE: 66 BPM | OXYGEN SATURATION: 97 % | DIASTOLIC BLOOD PRESSURE: 65 MMHG

## 2022-10-13 LAB
ALBUMIN SERPL ELPH-MCNC: 4.5 G/DL — SIGNIFICANT CHANGE UP (ref 3.3–5)
ALP SERPL-CCNC: 74 U/L — SIGNIFICANT CHANGE UP (ref 40–120)
ALT FLD-CCNC: 15 U/L — SIGNIFICANT CHANGE UP (ref 10–45)
ANION GAP SERPL CALC-SCNC: 15 MMOL/L — SIGNIFICANT CHANGE UP (ref 5–17)
AST SERPL-CCNC: 20 U/L — SIGNIFICANT CHANGE UP (ref 10–40)
BASOPHILS # BLD AUTO: 0.03 K/UL — SIGNIFICANT CHANGE UP (ref 0–0.2)
BASOPHILS NFR BLD AUTO: 0.5 % — SIGNIFICANT CHANGE UP (ref 0–2)
BILIRUB SERPL-MCNC: 0.5 MG/DL — SIGNIFICANT CHANGE UP (ref 0.2–1.2)
BUN SERPL-MCNC: 11 MG/DL — SIGNIFICANT CHANGE UP (ref 7–23)
CALCIUM SERPL-MCNC: 9.7 MG/DL — SIGNIFICANT CHANGE UP (ref 8.4–10.5)
CHLORIDE SERPL-SCNC: 100 MMOL/L — SIGNIFICANT CHANGE UP (ref 96–108)
CO2 SERPL-SCNC: 22 MMOL/L — SIGNIFICANT CHANGE UP (ref 22–31)
CREAT SERPL-MCNC: 0.66 MG/DL — SIGNIFICANT CHANGE UP (ref 0.5–1.3)
EGFR: 91 ML/MIN/1.73M2 — SIGNIFICANT CHANGE UP
EOSINOPHIL # BLD AUTO: 0.12 K/UL — SIGNIFICANT CHANGE UP (ref 0–0.5)
EOSINOPHIL NFR BLD AUTO: 1.9 % — SIGNIFICANT CHANGE UP (ref 0–6)
GLUCOSE SERPL-MCNC: 95 MG/DL — SIGNIFICANT CHANGE UP (ref 70–99)
HCT VFR BLD CALC: 41.4 % — SIGNIFICANT CHANGE UP (ref 34.5–45)
HGB BLD-MCNC: 14 G/DL — SIGNIFICANT CHANGE UP (ref 11.5–15.5)
IMM GRANULOCYTES NFR BLD AUTO: 0.2 % — SIGNIFICANT CHANGE UP (ref 0–0.9)
LYMPHOCYTES # BLD AUTO: 1.59 K/UL — SIGNIFICANT CHANGE UP (ref 1–3.3)
LYMPHOCYTES # BLD AUTO: 25.8 % — SIGNIFICANT CHANGE UP (ref 13–44)
MAGNESIUM SERPL-MCNC: 2.2 MG/DL — SIGNIFICANT CHANGE UP (ref 1.6–2.6)
MCHC RBC-ENTMCNC: 29.7 PG — SIGNIFICANT CHANGE UP (ref 27–34)
MCHC RBC-ENTMCNC: 33.8 GM/DL — SIGNIFICANT CHANGE UP (ref 32–36)
MCV RBC AUTO: 87.7 FL — SIGNIFICANT CHANGE UP (ref 80–100)
MONOCYTES # BLD AUTO: 0.45 K/UL — SIGNIFICANT CHANGE UP (ref 0–0.9)
MONOCYTES NFR BLD AUTO: 7.3 % — SIGNIFICANT CHANGE UP (ref 2–14)
NEUTROPHILS # BLD AUTO: 3.97 K/UL — SIGNIFICANT CHANGE UP (ref 1.8–7.4)
NEUTROPHILS NFR BLD AUTO: 64.3 % — SIGNIFICANT CHANGE UP (ref 43–77)
NRBC # BLD: 0 /100 WBCS — SIGNIFICANT CHANGE UP (ref 0–0)
PLATELET # BLD AUTO: 248 K/UL — SIGNIFICANT CHANGE UP (ref 150–400)
POTASSIUM SERPL-MCNC: 4.3 MMOL/L — SIGNIFICANT CHANGE UP (ref 3.5–5.3)
POTASSIUM SERPL-SCNC: 4.3 MMOL/L — SIGNIFICANT CHANGE UP (ref 3.5–5.3)
PROT SERPL-MCNC: 6.7 G/DL — SIGNIFICANT CHANGE UP (ref 6–8.3)
RBC # BLD: 4.72 M/UL — SIGNIFICANT CHANGE UP (ref 3.8–5.2)
RBC # FLD: 12.8 % — SIGNIFICANT CHANGE UP (ref 10.3–14.5)
SODIUM SERPL-SCNC: 137 MMOL/L — SIGNIFICANT CHANGE UP (ref 135–145)
WBC # BLD: 6.17 K/UL — SIGNIFICANT CHANGE UP (ref 3.8–10.5)
WBC # FLD AUTO: 6.17 K/UL — SIGNIFICANT CHANGE UP (ref 3.8–10.5)

## 2022-10-13 PROCEDURE — G6002: CPT

## 2022-10-13 PROCEDURE — G6015: CPT

## 2022-10-14 DIAGNOSIS — Z51.11 ENCOUNTER FOR ANTINEOPLASTIC CHEMOTHERAPY: ICD-10-CM

## 2022-10-14 DIAGNOSIS — E86.0 DEHYDRATION: ICD-10-CM

## 2022-10-14 DIAGNOSIS — R11.2 NAUSEA WITH VOMITING, UNSPECIFIED: ICD-10-CM

## 2022-10-14 PROCEDURE — G6015: CPT

## 2022-10-14 PROCEDURE — G6002: CPT

## 2022-10-17 PROCEDURE — G6015: CPT

## 2022-10-17 PROCEDURE — G6002: CPT

## 2022-10-18 ENCOUNTER — NON-APPOINTMENT (OUTPATIENT)
Age: 77
End: 2022-10-18

## 2022-10-18 VITALS
WEIGHT: 143 LBS | HEIGHT: 64 IN | OXYGEN SATURATION: 98 % | BODY MASS INDEX: 24.41 KG/M2 | DIASTOLIC BLOOD PRESSURE: 70 MMHG | HEART RATE: 70 BPM | RESPIRATION RATE: 20 BRPM | SYSTOLIC BLOOD PRESSURE: 122 MMHG

## 2022-10-18 PROCEDURE — 77014: CPT

## 2022-10-18 PROCEDURE — G6015: CPT

## 2022-10-18 PROCEDURE — 77336 RADIATION PHYSICS CONSULT: CPT

## 2022-10-18 NOTE — DISEASE MANAGEMENT
[Pathological] : TNM Stage: p [IIA] : IIA [TTNM] : 2a2 [MTNM] : 0 [NTNM] : 0 [de-identified] : 1000 [de-identified] : 3767 [de-identified] : Pelvis

## 2022-10-18 NOTE — HISTORY OF PRESENT ILLNESS
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 Schmidt.\par \par ECC and biopsy 9/15/2021  showed chronic and acute inflammation, reactive atypia, no evidence of dysplasia.\par \par She had an NITIN pap 8/3/21 and follow up colposcopy with biopsies  was negative for dysplasia or cancer.  \par  Pap on 8/8/22 again noted NITIN, favoring neoplasia. HPV negative.\par  8/8/22 Colposcopy with biopsies demonstrated well differentiated endocervical adenocarcinoma on the ECC.  \par \par She denies bleeding or pain and has always worn a tampon and continues to wear one for "cleanliness reasons."   She also takes Premarin PO for hot flashes.\par \par 8/8/22 Colposcopy ([facility]) - task sent for [facility] path review 8/30/22 \par Cervix:  12 o'clock:  atypical endocervical epithelium, squamous mucosa with reactive atypia.  \par ECC:  endocervical adenocarcinoma, well differentiated.  Tumor cells positive for P16 and Ki-67 was increased.  ER and [place] were negative.  \par \par 8/24/22 PET CT \par 1.  Hypermetabolic soft tissue mass arising from the LEFT portion of the vaginal cuff, consistent with malignancy.\par 2.  Nonspecific findings in the skeleton; there is an area of non--periarticular uptake within L1 where malignancy is not excluded. Remaining findings are indeterminate but are favored to be degenerative. MRI may be helpful.\par 3.  Micronodularity along the LEFT oblique fissure, likely inflammatory.\par \par MRI pelvis 9/12/22 In a patient with positive biopsy of the cervical stump there appears be prominent cervix with cystic change without measurable mass. There is no evidence of extension into surrounding parametrium. No adenopathy can be seen.\par \par She is seen for OTV #5/25. + fatigue and constipation following her first cisplatin. Otherwise well.

## 2022-10-19 PROCEDURE — G6002: CPT

## 2022-10-19 PROCEDURE — 77427 RADIATION TX MANAGEMENT X5: CPT

## 2022-10-19 PROCEDURE — G6015: CPT

## 2022-10-20 ENCOUNTER — RESULT REVIEW (OUTPATIENT)
Age: 77
End: 2022-10-20

## 2022-10-20 ENCOUNTER — APPOINTMENT (OUTPATIENT)
Dept: HEMATOLOGY ONCOLOGY | Facility: CLINIC | Age: 77
End: 2022-10-20

## 2022-10-20 ENCOUNTER — APPOINTMENT (OUTPATIENT)
Dept: INFUSION THERAPY | Facility: CLINIC | Age: 77
End: 2022-10-20

## 2022-10-20 VITALS
RESPIRATION RATE: 20 BRPM | TEMPERATURE: 97.7 F | WEIGHT: 144.38 LBS | OXYGEN SATURATION: 98 % | HEIGHT: 64 IN | DIASTOLIC BLOOD PRESSURE: 78 MMHG | SYSTOLIC BLOOD PRESSURE: 137 MMHG | HEART RATE: 79 BPM | BODY MASS INDEX: 24.65 KG/M2

## 2022-10-20 LAB
ALBUMIN SERPL ELPH-MCNC: 4.2 G/DL — SIGNIFICANT CHANGE UP (ref 3.3–5)
ALP SERPL-CCNC: 70 U/L — SIGNIFICANT CHANGE UP (ref 40–120)
ALT FLD-CCNC: 14 U/L — SIGNIFICANT CHANGE UP (ref 10–45)
ANION GAP SERPL CALC-SCNC: 12 MMOL/L — SIGNIFICANT CHANGE UP (ref 5–17)
AST SERPL-CCNC: 21 U/L — SIGNIFICANT CHANGE UP (ref 10–40)
BASOPHILS # BLD AUTO: 0.01 K/UL — SIGNIFICANT CHANGE UP (ref 0–0.2)
BASOPHILS NFR BLD AUTO: 0.2 % — SIGNIFICANT CHANGE UP (ref 0–2)
BILIRUB SERPL-MCNC: 0.4 MG/DL — SIGNIFICANT CHANGE UP (ref 0.2–1.2)
BUN SERPL-MCNC: 12 MG/DL — SIGNIFICANT CHANGE UP (ref 7–23)
CALCIUM SERPL-MCNC: 9.5 MG/DL — SIGNIFICANT CHANGE UP (ref 8.4–10.5)
CHLORIDE SERPL-SCNC: 101 MMOL/L — SIGNIFICANT CHANGE UP (ref 96–108)
CO2 SERPL-SCNC: 24 MMOL/L — SIGNIFICANT CHANGE UP (ref 22–31)
CREAT SERPL-MCNC: 0.6 MG/DL — SIGNIFICANT CHANGE UP (ref 0.5–1.3)
EGFR: 93 ML/MIN/1.73M2 — SIGNIFICANT CHANGE UP
EOSINOPHIL # BLD AUTO: 0.18 K/UL — SIGNIFICANT CHANGE UP (ref 0–0.5)
EOSINOPHIL NFR BLD AUTO: 3.9 % — SIGNIFICANT CHANGE UP (ref 0–6)
GLUCOSE SERPL-MCNC: 94 MG/DL — SIGNIFICANT CHANGE UP (ref 70–99)
HCT VFR BLD CALC: 38.5 % — SIGNIFICANT CHANGE UP (ref 34.5–45)
HGB BLD-MCNC: 13.3 G/DL — SIGNIFICANT CHANGE UP (ref 11.5–15.5)
IMM GRANULOCYTES NFR BLD AUTO: 0.6 % — SIGNIFICANT CHANGE UP (ref 0–0.9)
LYMPHOCYTES # BLD AUTO: 1.03 K/UL — SIGNIFICANT CHANGE UP (ref 1–3.3)
LYMPHOCYTES # BLD AUTO: 22.1 % — SIGNIFICANT CHANGE UP (ref 13–44)
MAGNESIUM SERPL-MCNC: 1.9 MG/DL — SIGNIFICANT CHANGE UP (ref 1.6–2.6)
MCHC RBC-ENTMCNC: 29.8 PG — SIGNIFICANT CHANGE UP (ref 27–34)
MCHC RBC-ENTMCNC: 34.5 GM/DL — SIGNIFICANT CHANGE UP (ref 32–36)
MCV RBC AUTO: 86.3 FL — SIGNIFICANT CHANGE UP (ref 80–100)
MONOCYTES # BLD AUTO: 0.39 K/UL — SIGNIFICANT CHANGE UP (ref 0–0.9)
MONOCYTES NFR BLD AUTO: 8.4 % — SIGNIFICANT CHANGE UP (ref 2–14)
NEUTROPHILS # BLD AUTO: 3.02 K/UL — SIGNIFICANT CHANGE UP (ref 1.8–7.4)
NEUTROPHILS NFR BLD AUTO: 64.8 % — SIGNIFICANT CHANGE UP (ref 43–77)
NRBC # BLD: 0 /100 WBCS — SIGNIFICANT CHANGE UP (ref 0–0)
PLATELET # BLD AUTO: 221 K/UL — SIGNIFICANT CHANGE UP (ref 150–400)
POTASSIUM SERPL-MCNC: 4.8 MMOL/L — SIGNIFICANT CHANGE UP (ref 3.5–5.3)
POTASSIUM SERPL-SCNC: 4.8 MMOL/L — SIGNIFICANT CHANGE UP (ref 3.5–5.3)
PROT SERPL-MCNC: 6.2 G/DL — SIGNIFICANT CHANGE UP (ref 6–8.3)
RBC # BLD: 4.46 M/UL — SIGNIFICANT CHANGE UP (ref 3.8–5.2)
RBC # FLD: 12.3 % — SIGNIFICANT CHANGE UP (ref 10.3–14.5)
SODIUM SERPL-SCNC: 137 MMOL/L — SIGNIFICANT CHANGE UP (ref 135–145)
WBC # BLD: 4.66 K/UL — SIGNIFICANT CHANGE UP (ref 3.8–10.5)
WBC # FLD AUTO: 4.66 K/UL — SIGNIFICANT CHANGE UP (ref 3.8–10.5)

## 2022-10-20 PROCEDURE — 99214 OFFICE O/P EST MOD 30 MIN: CPT

## 2022-10-20 PROCEDURE — G6015: CPT

## 2022-10-20 PROCEDURE — G6002: CPT

## 2022-10-21 PROCEDURE — G6002: CPT

## 2022-10-21 PROCEDURE — G6015: CPT

## 2022-10-24 PROCEDURE — G6002: CPT

## 2022-10-24 PROCEDURE — G6015: CPT

## 2022-10-25 ENCOUNTER — NON-APPOINTMENT (OUTPATIENT)
Age: 77
End: 2022-10-25

## 2022-10-25 VITALS
DIASTOLIC BLOOD PRESSURE: 80 MMHG | HEIGHT: 64 IN | OXYGEN SATURATION: 98 % | HEART RATE: 65 BPM | BODY MASS INDEX: 24.24 KG/M2 | WEIGHT: 142 LBS | RESPIRATION RATE: 19 BRPM | SYSTOLIC BLOOD PRESSURE: 122 MMHG

## 2022-10-25 PROCEDURE — G6015: CPT

## 2022-10-25 PROCEDURE — 77336 RADIATION PHYSICS CONSULT: CPT

## 2022-10-25 PROCEDURE — 77014: CPT

## 2022-10-25 NOTE — REVIEW OF SYSTEMS
[Diarrhea: Grade 1 - Increase of <4 stools per day over baseline; mild increase in ostomy output compared to baseline] : Diarrhea: Grade 1 - Increase of <4 stools per day over baseline; mild increase in ostomy output compared to baseline

## 2022-10-25 NOTE — HISTORY OF PRESENT ILLNESS
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 Schmidt.\par \par ECC and biopsy 9/15/2021  showed chronic and acute inflammation, reactive atypia, no evidence of dysplasia.\par \par She had an NITIN pap 8/3/21 and follow up colposcopy with biopsies  was negative for dysplasia or cancer.  \par  Pap on 8/8/22 again noted NITIN, favoring neoplasia. HPV negative.\par  8/8/22 Colposcopy with biopsies demonstrated well differentiated endocervical adenocarcinoma on the ECC.  \par \par She denies bleeding or pain and has always worn a tampon and continues to wear one for "cleanliness reasons."   She also takes Premarin PO for hot flashes.\par \par 8/8/22 Colposcopy ([facility]) - task sent for [facility] path review 8/30/22 \par Cervix:  12 o'clock:  atypical endocervical epithelium, squamous mucosa with reactive atypia.  \par ECC:  endocervical adenocarcinoma, well differentiated.  Tumor cells positive for P16 and Ki-67 was increased.  ER and [place] were negative.  \par \par 8/24/22 PET CT \par 1.  Hypermetabolic soft tissue mass arising from the LEFT portion of the vaginal cuff, consistent with malignancy.\par 2.  Nonspecific findings in the skeleton; there is an area of non--periarticular uptake within L1 where malignancy is not excluded. Remaining findings are indeterminate but are favored to be degenerative. MRI may be helpful.\par 3.  Micronodularity along the LEFT oblique fissure, likely inflammatory.\par \par MRI pelvis 9/12/22 In a patient with positive biopsy of the cervical stump there appears be prominent cervix with cystic change without measurable mass. There is no evidence of extension into surrounding parametrium. No adenopathy can be seen.\par \par She is seen for OTV #10/25. + fatigue and constipation following her first cisplatin. States she now has loose stools and just purchased Imodium. Otherwise well. CIsplatin Q Thursday with bloodwork.

## 2022-10-25 NOTE — DISEASE MANAGEMENT
[Pathological] : TNM Stage: p [IIA] : IIA [TTNM] : 2a2 [NTNM] : 0 [MTNM] : 0 [de-identified] : 2000 [de-identified] : 7162 [de-identified] : Pelvis

## 2022-10-26 ENCOUNTER — NON-APPOINTMENT (OUTPATIENT)
Age: 77
End: 2022-10-26

## 2022-10-26 ENCOUNTER — APPOINTMENT (OUTPATIENT)
Dept: RADIATION ONCOLOGY | Facility: CLINIC | Age: 77
End: 2022-10-26

## 2022-10-26 ENCOUNTER — OUTPATIENT (OUTPATIENT)
Dept: OUTPATIENT SERVICES | Facility: HOSPITAL | Age: 77
LOS: 1 days | Discharge: HOME | End: 2022-10-26

## 2022-10-26 VITALS
TEMPERATURE: 97 F | HEART RATE: 90 BPM | RESPIRATION RATE: 16 BRPM | OXYGEN SATURATION: 97 % | DIASTOLIC BLOOD PRESSURE: 76 MMHG | WEIGHT: 143 LBS | BODY MASS INDEX: 24.41 KG/M2 | HEIGHT: 64 IN | SYSTOLIC BLOOD PRESSURE: 158 MMHG

## 2022-10-26 PROCEDURE — G6002: CPT

## 2022-10-26 PROCEDURE — 99215 OFFICE O/P EST HI 40 MIN: CPT | Mod: 25

## 2022-10-26 PROCEDURE — G6015: CPT

## 2022-10-26 NOTE — DISEASE MANAGEMENT
[Clinical] : TNM Stage: c [FreeTextEntry4] : endocervical adenocarcinoma [TTNM] : 2a2 [NTNM] : 0 [MTNM] : 0

## 2022-10-26 NOTE — REVIEW OF SYSTEMS
[Constipation] : constipation [Diarrhea] : diarrhea [Vaginal Discharge] : vaginal discharge [Negative] : Allergic/Immunologic [Constipation: Grade 1 - Occasional or intermittent symptoms; occasional use of stool softeners, laxatives, dietary modification, or enema] : Constipation: Grade 1 - Occasional or intermittent symptoms; occasional use of stool softeners, laxatives, dietary modification, or enema [Diarrhea: Grade 1 - Increase of <4 stools per day over baseline; mild increase in ostomy output compared to baseline] : Diarrhea: Grade 1 - Increase of <4 stools per day over baseline; mild increase in ostomy output compared to baseline [Edema Limbs: Grade 0] : Edema Limbs: Grade 0  [Fatigue: Grade 0] : Fatigue: Grade 0 [Localized Edema: Grade 0] : Localized Edema: Grade 0  [Neck Edema: Grade 0] : Neck Edema: Grade 0 [Hematuria: Grade 0] : Hematuria: Grade 0 [Urinary Incontinence: Grade 0] : Urinary Incontinence: Grade 0  [Urinary Retention: Grade 0] : Urinary Retention: Grade 0 [Urinary Tract Pain: Grade 0] : Urinary Tract Pain: Grade 0 [Urinary Urgency: Grade 0] : Urinary Urgency: Grade 0 [Urinary Frequency: Grade 0] : Urinary Frequency: Grade 0 [Dyspareunia: Grade 0] : Dyspareunia: Grade 0

## 2022-10-26 NOTE — LETTER CLOSING
[Consult Closing:] : Thank you for allowing me to participate in the care of this patient.  If you have any questions, please do not hesitate to contact me. [Sincerely yours,] : Sincerely yours, [FreeTextEntry3] : Valdo Colmenares MD\par  of Radiation Medicine, Quality and Safety\par  of Radiation Medicine\par [facility] [employer] [facility]\par [facility]\par [facility]\par

## 2022-10-26 NOTE — HISTORY OF PRESENT ILLNESS
[FreeTextEntry1] : 77 year old woman presents today for consultation regarding brachytherapy for cervical carcinoma.\par \par Her PMH is significant for supracervical hysterectomy/BSO in 2001 for fibroids.\par \par She report long-standing history of clear/white vaginal discharge, for which she wears a tampon.  No vaginal bleeding.\par \par She followed with Dr. Espinosa (GYN), and previously underwent pap on 8/3/21 which showed NITIN.  Follow up ECC and biopsy on 9/15/21 were negative for dysplasia.\par \par 8/8/22 pap again showed NITIN, though favoring neoplasia. HPV negative.\par \par 8/8/22 ECC showed endocervical adenocarcinoma, well-differentiated.\par \par 8/24/22 PET CT showed a hypermetabolic soft tissue mass arising from the LEFT portion of the vaginal cuff, measuring 5.5 x 4.8 x 3.5 cm, consistent with malignancy.\par \par She met with Dr. Schmidt on 8/30/22.\par \par 9/12/22 MRI pelvis showed residual cervix with heterogeneous enhancement; cervix measured 4.0 x 3.8 x 3.6 cm.  No significant infiltration into the surrounding fat.  No pelvic lymphadenopathy.\par \par She met with Dr. Mahoney and with Dr. Matos on 9/23/22, and is currently undergoing chemoradiation with weekly cisplatin.\par \par She continues to have clear to cream discharge.  Her bowel movements vacillate between diarrhea and constipation.  No vaginal pruritus or pelvic edema.  No urinary bother.  No abdominopelvic pain.  No unintentional weight loss.\par

## 2022-10-26 NOTE — PHYSICAL EXAM
[Sclera] : the sclera and conjunctiva were normal [Extraocular Movements] : extraocular movements were intact [Outer Ear] : the ears and nose were normal in appearance [Examination Of The Oral Cavity] : the lips and gums were normal [Normal External Genitalia] : normal external genitalia  [Inguinal Lymph Nodes Enlarged Bilaterally] : inguinal [Normal] : no focal deficits [de-identified] : Examination performed in the presence of a female chaperone. Tumor at left cervix, 3.5 cm, involving left vaginal fornix. No appreciable parametrial involvement.

## 2022-10-27 ENCOUNTER — RESULT REVIEW (OUTPATIENT)
Age: 77
End: 2022-10-27

## 2022-10-27 ENCOUNTER — APPOINTMENT (OUTPATIENT)
Dept: INFUSION THERAPY | Facility: CLINIC | Age: 77
End: 2022-10-27

## 2022-10-27 VITALS
RESPIRATION RATE: 18 BRPM | BODY MASS INDEX: 24.32 KG/M2 | WEIGHT: 142.44 LBS | HEART RATE: 87 BPM | TEMPERATURE: 98.1 F | HEIGHT: 64 IN | OXYGEN SATURATION: 96 % | DIASTOLIC BLOOD PRESSURE: 67 MMHG | SYSTOLIC BLOOD PRESSURE: 139 MMHG

## 2022-10-27 DIAGNOSIS — C53.0 MALIGNANT NEOPLASM OF ENDOCERVIX: ICD-10-CM

## 2022-10-27 LAB
ALBUMIN SERPL ELPH-MCNC: 4.1 G/DL — SIGNIFICANT CHANGE UP (ref 3.3–5)
ALP SERPL-CCNC: 78 U/L — SIGNIFICANT CHANGE UP (ref 40–120)
ALT FLD-CCNC: 17 U/L — SIGNIFICANT CHANGE UP (ref 10–45)
ANION GAP SERPL CALC-SCNC: 14 MMOL/L — SIGNIFICANT CHANGE UP (ref 5–17)
AST SERPL-CCNC: 21 U/L — SIGNIFICANT CHANGE UP (ref 10–40)
BASOPHILS # BLD AUTO: 0.01 K/UL — SIGNIFICANT CHANGE UP (ref 0–0.2)
BASOPHILS NFR BLD AUTO: 0.2 % — SIGNIFICANT CHANGE UP (ref 0–2)
BILIRUB SERPL-MCNC: 0.2 MG/DL — SIGNIFICANT CHANGE UP (ref 0.2–1.2)
BUN SERPL-MCNC: 19 MG/DL — SIGNIFICANT CHANGE UP (ref 7–23)
CALCIUM SERPL-MCNC: 9 MG/DL — SIGNIFICANT CHANGE UP (ref 8.4–10.5)
CHLORIDE SERPL-SCNC: 103 MMOL/L — SIGNIFICANT CHANGE UP (ref 96–108)
CO2 SERPL-SCNC: 20 MMOL/L — LOW (ref 22–31)
CREAT SERPL-MCNC: 0.7 MG/DL — SIGNIFICANT CHANGE UP (ref 0.5–1.3)
EGFR: 90 ML/MIN/1.73M2 — SIGNIFICANT CHANGE UP
EOSINOPHIL # BLD AUTO: 0.25 K/UL — SIGNIFICANT CHANGE UP (ref 0–0.5)
EOSINOPHIL NFR BLD AUTO: 5.8 % — SIGNIFICANT CHANGE UP (ref 0–6)
GLUCOSE SERPL-MCNC: 95 MG/DL — SIGNIFICANT CHANGE UP (ref 70–99)
HCT VFR BLD CALC: 35.9 % — SIGNIFICANT CHANGE UP (ref 34.5–45)
HGB BLD-MCNC: 12.4 G/DL — SIGNIFICANT CHANGE UP (ref 11.5–15.5)
IMM GRANULOCYTES NFR BLD AUTO: 0.5 % — SIGNIFICANT CHANGE UP (ref 0–0.9)
LYMPHOCYTES # BLD AUTO: 0.46 K/UL — LOW (ref 1–3.3)
LYMPHOCYTES # BLD AUTO: 10.6 % — LOW (ref 13–44)
MAGNESIUM SERPL-MCNC: 1.9 MG/DL — SIGNIFICANT CHANGE UP (ref 1.6–2.6)
MCHC RBC-ENTMCNC: 30 PG — SIGNIFICANT CHANGE UP (ref 27–34)
MCHC RBC-ENTMCNC: 34.5 GM/DL — SIGNIFICANT CHANGE UP (ref 32–36)
MCV RBC AUTO: 86.7 FL — SIGNIFICANT CHANGE UP (ref 80–100)
MONOCYTES # BLD AUTO: 0.35 K/UL — SIGNIFICANT CHANGE UP (ref 0–0.9)
MONOCYTES NFR BLD AUTO: 8.1 % — SIGNIFICANT CHANGE UP (ref 2–14)
NEUTROPHILS # BLD AUTO: 3.25 K/UL — SIGNIFICANT CHANGE UP (ref 1.8–7.4)
NEUTROPHILS NFR BLD AUTO: 74.8 % — SIGNIFICANT CHANGE UP (ref 43–77)
NRBC # BLD: 0 /100 WBCS — SIGNIFICANT CHANGE UP (ref 0–0)
PLATELET # BLD AUTO: 138 K/UL — LOW (ref 150–400)
POTASSIUM SERPL-MCNC: 4.1 MMOL/L — SIGNIFICANT CHANGE UP (ref 3.5–5.3)
POTASSIUM SERPL-SCNC: 4.1 MMOL/L — SIGNIFICANT CHANGE UP (ref 3.5–5.3)
PROT SERPL-MCNC: 6.4 G/DL — SIGNIFICANT CHANGE UP (ref 6–8.3)
RBC # BLD: 4.14 M/UL — SIGNIFICANT CHANGE UP (ref 3.8–5.2)
RBC # FLD: 12.4 % — SIGNIFICANT CHANGE UP (ref 10.3–14.5)
SODIUM SERPL-SCNC: 137 MMOL/L — SIGNIFICANT CHANGE UP (ref 135–145)
WBC # BLD: 4.34 K/UL — SIGNIFICANT CHANGE UP (ref 3.8–10.5)
WBC # FLD AUTO: 4.34 K/UL — SIGNIFICANT CHANGE UP (ref 3.8–10.5)

## 2022-10-27 PROCEDURE — G6015: CPT

## 2022-10-27 PROCEDURE — G6002: CPT

## 2022-10-28 PROCEDURE — G6015: CPT

## 2022-10-28 PROCEDURE — G6002: CPT

## 2022-10-30 PROCEDURE — G6015: CPT

## 2022-10-30 PROCEDURE — G6002: CPT

## 2022-10-31 ENCOUNTER — APPOINTMENT (OUTPATIENT)
Dept: HEMATOLOGY ONCOLOGY | Facility: CLINIC | Age: 77
End: 2022-10-31

## 2022-10-31 VITALS
WEIGHT: 144.56 LBS | DIASTOLIC BLOOD PRESSURE: 65 MMHG | RESPIRATION RATE: 17 BRPM | OXYGEN SATURATION: 97 % | BODY MASS INDEX: 24.81 KG/M2 | HEART RATE: 100 BPM | SYSTOLIC BLOOD PRESSURE: 95 MMHG | TEMPERATURE: 98.3 F

## 2022-10-31 DIAGNOSIS — Z86.69 PERSONAL HISTORY OF OTHER DISEASES OF THE NERVOUS SYSTEM AND SENSE ORGANS: ICD-10-CM

## 2022-10-31 PROCEDURE — 99215 OFFICE O/P EST HI 40 MIN: CPT

## 2022-10-31 PROCEDURE — G6002: CPT

## 2022-10-31 PROCEDURE — G6015: CPT

## 2022-10-31 PROCEDURE — 77336 RADIATION PHYSICS CONSULT: CPT

## 2022-10-31 NOTE — RESULTS/DATA
[FreeTextEntry1] : 9/12/22 MRI Pelvis: In a patient with positive biopsy of the cervical stump there appears be prominent cervix with cystic change without measurable mass. There is no evidence of extension into surrounding parametrium. No adenopathy can be seen.\par \par 9/1/22 Path: PAP: Cervical epithelial cell abnormality. Atypical glandular cells present, favor neoplastic.\par \par 8/24/22 PET: Hypermetabolic soft tissue mass arising from the LEFT portion of the vaginal cuff, consistent with malignancy. Nonspecific findings in the skeleton; there is an area of non--periarticular uptake within L1 where malignancy is not excluded. Remaining findings are indeterminate but are favored to be degenerative. Micronodularity along the LEFT oblique fissure, likely inflammatory.\par Addendum: \par Patient was seen by the referring service on 8/30/2022. Patient was confirmed on physical examination to have a residual cervical stump after supracervical hysterectomy, clinically consistent with an endocervical tumor (?barrel cervix?) on exam, as well as being biopsy-proven. The hypermetabolic soft tissue mass described in the original report is therefore not arising from the vaginal cuff but in fact from the cervical stump. No other changes to original report.\par \par 8/8/22 Path: Cervical biopsy: Atypical endocervical epithelium. Squamous mucosa with reactive atypia. See part B. \par Part B: Endocervical adenocarcinoma, well-differentiated. Tumor cells positive for P16 and Ki-67 was increased. Er and [place] were negative.

## 2022-10-31 NOTE — PHYSICAL EXAM
[Restricted in physically strenuous activity but ambulatory and able to carry out work of a light or sedentary nature] : Status 1- Restricted in physically strenuous activity but ambulatory and able to carry out work of a light or sedentary nature, e.g., light house work, office work [Normal] : grossly intact [de-identified] : wt stable  [de-identified] : fear of claustrophobia, asphyxiation

## 2022-10-31 NOTE — REVIEW OF SYSTEMS
[Anxiety] : anxiety [Negative] : Allergic/Immunologic [Recent Change In Weight] : ~T no recent weight change [Dysphagia] : no dysphagia [Odynophagia] : no odynophagia [Chest Pain] : no chest pain [Shortness Of Breath] : no shortness of breath [Abdominal Pain] : no abdominal pain [de-identified] : fear of asphyxiation

## 2022-10-31 NOTE — HISTORY OF PRESENT ILLNESS
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 She had a colposcopy on 8/8/22 and pathology showed atypical endocervical epithelium. Squamous mucosa with reactive atypia. See part B. Part B: Endocervical adenocarcinoma, well-differentiated. Tumor cells positive for P16 and Ki-67 was increased. Er and [place] were negative. She had a PET on 8/24/22 which showed hypermetabolic soft tissue mass arising from the LEFT portion of the vaginal cuff, consistent with malignancy. Nonspecific findings in the skeleton; there is an area of non--periarticular uptake within L1 where malignancy is not excluded. Remaining findings are indeterminate but are favored to be degenerative. Micronodularity along the LEFT oblique fissure, likely inflammatory.\par Addendum: \par Patient was seen by the referring service on 8/30/2022. Patient was confirmed on physical examination to have a residual cervical stump after supracervical hysterectomy, clinically consistent with an endocervical tumor (?barrel cervix?) on exam, as well as being biopsy-proven. The hypermetabolic soft tissue mass described in the original report is therefore not arising from the vaginal cuff but in fact from the cervical stump. No other changes to original report. \par Consult slides from 9/1/22 showed a pathology of cervical epithelial cell abnormality. Atypical glandular cells present, favor neoplastic. On 9/12/22 an MRI pelvis showed in a patient with positive biopsy of the cervical stump there appears be prominent cervix with cystic change without measurable mass. There is no evidence of extension into surrounding parametrium. No adenopathy can be seen. [de-identified] : PMH: Bell's Palsy 2018 (resolved but has hyperacute hearing bilaterally); selective claustrophobia and phobia of asphyxiation which affects her ability to wear a standard mask. \par PSH: tonsillectomy (age 19);  (); tubal ligation with TOP (); supracervical hysterectomy BSO for fibroids (); varicose vein ligation (); blepharoplasty (); right breast biopsy / FNA - benign (cannot recall year). \par Family Hx: Mother breast ca, 66 yo; Father abdominal ca, 68 yo; Brother pancreatic ca, 67; maternal aunt breast ca; maternal cousin breast ca  26 yo \par Social Hx: smoker for 15 years, quit at 30 yo; ETOH wine 2-3 oz once a day; retired, retail and real estate; , lives with her ; family close by  [de-identified] : Today she is C2D1 of Cisplatin with concurrent RT. She c/o constipation after first cycle, took miralax and resolved. He reported mild fatigue, was able to walk and perform household chores. Ototoxicity + ringing in ears / mild tinnitus (elected not to have baseline hearing test). She denies N/V; Diarrhea; mouth sores; nail / skin changes; hair loss; \par neuropathy and change in taste.

## 2022-10-31 NOTE — HISTORY OF PRESENT ILLNESS
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 She had a colposcopy on 8/8/22 and pathology showed atypical endocervical epithelium. Squamous mucosa with reactive atypia. See part B. Part B: Endocervical adenocarcinoma, well-differentiated. Tumor cells positive for P16 and Ki-67 was increased. Er and TX were negative. She had a PET on 8/24/22 which showed hypermetabolic soft tissue mass arising from the LEFT portion of the vaginal cuff, consistent with malignancy. Nonspecific findings in the skeleton; there is an area of non--periarticular uptake within L1 where malignancy is not excluded. Remaining findings are indeterminate but are favored to be degenerative. Micronodularity along the LEFT oblique fissure, likely inflammatory.\par Addendum: \par Patient was seen by the referring service on 8/30/2022. Patient was confirmed on physical examination to have a residual cervical stump after supracervical hysterectomy, clinically consistent with an endocervical tumor (?barrel cervix?) on exam, as well as being biopsy-proven. The hypermetabolic soft tissue mass described in the original report is therefore not arising from the vaginal cuff but in fact from the cervical stump. No other changes to original report. \par Consult slides from 9/1/22 showed a pathology of cervical epithelial cell abnormality. Atypical glandular cells present, favor neoplastic. On 9/12/22 an MRI pelvis showed in a patient with positive biopsy of the cervical stump there appears be prominent cervix with cystic change without measurable mass. There is no evidence of extension into surrounding parametrium. No adenopathy can be seen. [de-identified] : PMH: Bell's Palsy 2018 (resolved but has hyperacute hearing bilaterally); selective claustrophobia and phobia of asphyxiation which affects her ability to wear a standard mask. \par PSH: tonsillectomy (age 19);  (); tubal ligation with TOP (); supracervical hysterectomy BSO for fibroids (); varicose vein ligation (); blepharoplasty (); right breast biopsy / FNA - benign (cannot recall year). \par Family Hx: Mother breast ca, 64 yo; Father abdominal ca, 66 yo; Brother pancreatic ca, 67; maternal aunt breast ca; maternal cousin breast ca  26 yo \par Social Hx: smoker for 15 years, quit at 30 yo; ETOH wine 2-3 oz once a day; retired, retail and real estate; , lives with her ; family close by  [de-identified] : Today she is C3D5 of Cisplatin with concurrent RT. She c/o constipation after first cycle, took miralax and resolved. He reported mild fatigue, was able to walk and perform household chores. Ototoxicity + ringing in ears / mild tinnitus (elected not to have baseline hearing test). She denies N/V; Diarrhea; mouth sores; nail / skin changes; hair loss; \par neuropathy and change in taste.

## 2022-10-31 NOTE — PHYSICAL EXAM
[Restricted in physically strenuous activity but ambulatory and able to carry out work of a light or sedentary nature] : Status 1- Restricted in physically strenuous activity but ambulatory and able to carry out work of a light or sedentary nature, e.g., light house work, office work [Normal] : grossly intact [de-identified] : wt stable  [de-identified] : fear of claustrophobia, asphyxiation

## 2022-10-31 NOTE — REVIEW OF SYSTEMS
[Anxiety] : anxiety [Negative] : Allergic/Immunologic [Recent Change In Weight] : ~T no recent weight change [Dysphagia] : no dysphagia [Odynophagia] : no odynophagia [Chest Pain] : no chest pain [Shortness Of Breath] : no shortness of breath [Abdominal Pain] : no abdominal pain [de-identified] : fear of asphyxiation

## 2022-11-01 ENCOUNTER — NON-APPOINTMENT (OUTPATIENT)
Age: 77
End: 2022-11-01

## 2022-11-01 VITALS
SYSTOLIC BLOOD PRESSURE: 124 MMHG | BODY MASS INDEX: 24.24 KG/M2 | DIASTOLIC BLOOD PRESSURE: 72 MMHG | WEIGHT: 142 LBS | OXYGEN SATURATION: 97 % | HEART RATE: 99 BPM | HEIGHT: 64 IN | RESPIRATION RATE: 21 BRPM

## 2022-11-01 PROCEDURE — G6002: CPT

## 2022-11-01 PROCEDURE — G6015: CPT

## 2022-11-01 PROCEDURE — 77427 RADIATION TX MANAGEMENT X5: CPT

## 2022-11-01 NOTE — HISTORY OF PRESENT ILLNESS
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 Schmidt.\par \par ECC and biopsy 9/15/2021  showed chronic and acute inflammation, reactive atypia, no evidence of dysplasia.\par \par She had an NITIN pap 8/3/21 and follow up colposcopy with biopsies  was negative for dysplasia or cancer.  \par  Pap on 8/8/22 again noted NITIN, favoring neoplasia. HPV negative.\par  8/8/22 Colposcopy with biopsies demonstrated well differentiated endocervical adenocarcinoma on the ECC.  \par \par She denies bleeding or pain and has always worn a tampon and continues to wear one for "cleanliness reasons."   She also takes Premarin PO for hot flashes.\par \par 8/8/22 Colposcopy ([facility]) - task sent for [facility] path review 8/30/22 \par Cervix:  12 o'clock:  atypical endocervical epithelium, squamous mucosa with reactive atypia.  \par ECC:  endocervical adenocarcinoma, well differentiated.  Tumor cells positive for P16 and Ki-67 was increased.  ER and DC were negative.  \par \par 8/24/22 PET CT \par 1.  Hypermetabolic soft tissue mass arising from the LEFT portion of the vaginal cuff, consistent with malignancy.\par 2.  Nonspecific findings in the skeleton; there is an area of non--periarticular uptake within L1 where malignancy is not excluded. Remaining findings are indeterminate but are favored to be degenerative. MRI may be helpful.\par 3.  Micronodularity along the LEFT oblique fissure, likely inflammatory.\par \par MRI pelvis 9/12/22 In a patient with positive biopsy of the cervical stump there appears be prominent cervix with cystic change without measurable mass. There is no evidence of extension into surrounding parametrium. No adenopathy can be seen.\par \par She is seen for OTV #16/25. + fatigue.. Loose stools alternating with constipation. Otherwise well. CIsplatin Q Thursday with bloodwork. Met with Dr. Colmenares to plan her sleeve placement and brachytherapy.

## 2022-11-01 NOTE — REVIEW OF SYSTEMS
[Constipation: Grade 1 - Occasional or intermittent symptoms; occasional use of stool softeners, laxatives, dietary modification, or enema] : Constipation: Grade 1 - Occasional or intermittent symptoms; occasional use of stool softeners, laxatives, dietary modification, or enema [Diarrhea: Grade 1 - Increase of <4 stools per day over baseline; mild increase in ostomy output compared to baseline] : Diarrhea: Grade 1 - Increase of <4 stools per day over baseline; mild increase in ostomy output compared to baseline [Fatigue: Grade 1 - Fatigue relieved by rest] : Fatigue: Grade 1 - Fatigue relieved by rest

## 2022-11-01 NOTE — DISEASE MANAGEMENT
[Pathological] : TNM Stage: p [IIA] : IIA [TTNM] : 2a2 [NTNM] : 0 [MTNM] : 0 [de-identified] : 0578 [de-identified] : 8279 [de-identified] : Pelvis

## 2022-11-01 NOTE — PHYSICAL EXAM
[Normal] : well developed, well nourished, in no acute distress [de-identified] : no perineal dermatitis

## 2022-11-02 PROCEDURE — G6015: CPT

## 2022-11-02 PROCEDURE — G6002: CPT

## 2022-11-03 ENCOUNTER — RESULT REVIEW (OUTPATIENT)
Age: 77
End: 2022-11-03

## 2022-11-03 ENCOUNTER — APPOINTMENT (OUTPATIENT)
Dept: INFUSION THERAPY | Facility: CLINIC | Age: 77
End: 2022-11-03

## 2022-11-03 VITALS
SYSTOLIC BLOOD PRESSURE: 126 MMHG | OXYGEN SATURATION: 99 % | DIASTOLIC BLOOD PRESSURE: 80 MMHG | WEIGHT: 142.13 LBS | BODY MASS INDEX: 24.4 KG/M2 | HEART RATE: 93 BPM | TEMPERATURE: 98.8 F | RESPIRATION RATE: 18 BRPM

## 2022-11-03 LAB
ALBUMIN SERPL ELPH-MCNC: 3.9 G/DL — SIGNIFICANT CHANGE UP (ref 3.3–5)
ALP SERPL-CCNC: 81 U/L — SIGNIFICANT CHANGE UP (ref 40–120)
ALT FLD-CCNC: 35 U/L — SIGNIFICANT CHANGE UP (ref 10–45)
ANION GAP SERPL CALC-SCNC: 9 MMOL/L — SIGNIFICANT CHANGE UP (ref 5–17)
AST SERPL-CCNC: 27 U/L — SIGNIFICANT CHANGE UP (ref 10–40)
BASOPHILS # BLD AUTO: 0.02 K/UL — SIGNIFICANT CHANGE UP (ref 0–0.2)
BASOPHILS NFR BLD AUTO: 0.4 % — SIGNIFICANT CHANGE UP (ref 0–2)
BILIRUB SERPL-MCNC: 0.4 MG/DL — SIGNIFICANT CHANGE UP (ref 0.2–1.2)
BUN SERPL-MCNC: 18 MG/DL — SIGNIFICANT CHANGE UP (ref 7–23)
CALCIUM SERPL-MCNC: 9.4 MG/DL — SIGNIFICANT CHANGE UP (ref 8.4–10.5)
CHLORIDE SERPL-SCNC: 103 MMOL/L — SIGNIFICANT CHANGE UP (ref 96–108)
CO2 SERPL-SCNC: 24 MMOL/L — SIGNIFICANT CHANGE UP (ref 22–31)
CREAT SERPL-MCNC: 0.72 MG/DL — SIGNIFICANT CHANGE UP (ref 0.5–1.3)
EGFR: 87 ML/MIN/1.73M2 — SIGNIFICANT CHANGE UP
EOSINOPHIL # BLD AUTO: 0.3 K/UL — SIGNIFICANT CHANGE UP (ref 0–0.5)
EOSINOPHIL NFR BLD AUTO: 5.8 % — SIGNIFICANT CHANGE UP (ref 0–6)
GLUCOSE SERPL-MCNC: 94 MG/DL — SIGNIFICANT CHANGE UP (ref 70–99)
HCT VFR BLD CALC: 34.6 % — SIGNIFICANT CHANGE UP (ref 34.5–45)
HGB BLD-MCNC: 11.9 G/DL — SIGNIFICANT CHANGE UP (ref 11.5–15.5)
HYPOCHROMIA BLD QL: SLIGHT — SIGNIFICANT CHANGE UP
IMM GRANULOCYTES NFR BLD AUTO: 1 % — HIGH (ref 0–0.9)
LG PLATELETS BLD QL AUTO: SLIGHT — SIGNIFICANT CHANGE UP
LYMPHOCYTES # BLD AUTO: 0.53 K/UL — LOW (ref 1–3.3)
LYMPHOCYTES # BLD AUTO: 10.3 % — LOW (ref 13–44)
MAGNESIUM SERPL-MCNC: 1.9 MG/DL — SIGNIFICANT CHANGE UP (ref 1.6–2.6)
MCHC RBC-ENTMCNC: 29.8 PG — SIGNIFICANT CHANGE UP (ref 27–34)
MCHC RBC-ENTMCNC: 34.4 GM/DL — SIGNIFICANT CHANGE UP (ref 32–36)
MCV RBC AUTO: 86.7 FL — SIGNIFICANT CHANGE UP (ref 80–100)
MONOCYTES # BLD AUTO: 0.46 K/UL — SIGNIFICANT CHANGE UP (ref 0–0.9)
MONOCYTES NFR BLD AUTO: 8.9 % — SIGNIFICANT CHANGE UP (ref 2–14)
NEUTROPHILS # BLD AUTO: 3.78 K/UL — SIGNIFICANT CHANGE UP (ref 1.8–7.4)
NEUTROPHILS NFR BLD AUTO: 73.6 % — SIGNIFICANT CHANGE UP (ref 43–77)
NRBC # BLD: 0 /100 WBCS — SIGNIFICANT CHANGE UP (ref 0–0)
PLAT MORPH BLD: NORMAL — SIGNIFICANT CHANGE UP
PLATELET # BLD AUTO: 109 K/UL — LOW (ref 150–400)
POTASSIUM SERPL-MCNC: 4.3 MMOL/L — SIGNIFICANT CHANGE UP (ref 3.5–5.3)
POTASSIUM SERPL-SCNC: 4.3 MMOL/L — SIGNIFICANT CHANGE UP (ref 3.5–5.3)
PROT SERPL-MCNC: 6.7 G/DL — SIGNIFICANT CHANGE UP (ref 6–8.3)
RBC # BLD: 3.99 M/UL — SIGNIFICANT CHANGE UP (ref 3.8–5.2)
RBC # FLD: 12.7 % — SIGNIFICANT CHANGE UP (ref 10.3–14.5)
RBC BLD AUTO: SIGNIFICANT CHANGE UP
SODIUM SERPL-SCNC: 136 MMOL/L — SIGNIFICANT CHANGE UP (ref 135–145)
WBC # BLD: 5.14 K/UL — SIGNIFICANT CHANGE UP (ref 3.8–10.5)
WBC # FLD AUTO: 5.14 K/UL — SIGNIFICANT CHANGE UP (ref 3.8–10.5)

## 2022-11-04 PROCEDURE — G6002: CPT

## 2022-11-04 PROCEDURE — G6015: CPT

## 2022-11-07 PROCEDURE — 77470 SPECIAL RADIATION TREATMENT: CPT | Mod: 26,59

## 2022-11-07 PROCEDURE — G6015: CPT

## 2022-11-07 PROCEDURE — G6002: CPT

## 2022-11-08 ENCOUNTER — NON-APPOINTMENT (OUTPATIENT)
Age: 77
End: 2022-11-08

## 2022-11-08 ENCOUNTER — APPOINTMENT (OUTPATIENT)
Dept: GYNECOLOGIC ONCOLOGY | Facility: CLINIC | Age: 77
End: 2022-11-08

## 2022-11-08 VITALS
WEIGHT: 139 LBS | HEART RATE: 90 BPM | RESPIRATION RATE: 22 BRPM | OXYGEN SATURATION: 98 % | HEIGHT: 64 IN | DIASTOLIC BLOOD PRESSURE: 82 MMHG | SYSTOLIC BLOOD PRESSURE: 120 MMHG | BODY MASS INDEX: 23.73 KG/M2

## 2022-11-08 VITALS
WEIGHT: 139 LBS | HEART RATE: 103 BPM | SYSTOLIC BLOOD PRESSURE: 145 MMHG | DIASTOLIC BLOOD PRESSURE: 85 MMHG | HEIGHT: 64 IN | BODY MASS INDEX: 23.73 KG/M2

## 2022-11-08 DIAGNOSIS — C53.0 MALIGNANT NEOPLASM OF ENDOCERVIX: ICD-10-CM

## 2022-11-08 PROCEDURE — 77336 RADIATION PHYSICS CONSULT: CPT

## 2022-11-08 PROCEDURE — 77014: CPT

## 2022-11-08 PROCEDURE — G6015: CPT

## 2022-11-08 PROCEDURE — 99213 OFFICE O/P EST LOW 20 MIN: CPT

## 2022-11-08 NOTE — DISEASE MANAGEMENT
[Pathological] : TNM Stage: p [IIA] : IIA [TTNM] : 2a2 [NTNM] : 0 [MTNM] : 0 [de-identified] : 9352 [de-identified] : 7187 [de-identified] : Pelvis

## 2022-11-08 NOTE — OB HISTORY
[Total Preg ___] : : [unfilled] [Full Term ___] : [unfilled] (full-term) [Abortions ___] : [unfilled] (abortions) [Living ___] : [unfilled] (living) [ ___] : [unfilled]  section delivery(s) [AB Spont ___] : [unfilled] miscarriage(s) [Menarche Age ____] : age at menarche was [unfilled] [Menopause  Age ____] : menopause occurred at age [unfilled]

## 2022-11-08 NOTE — LETTER BODY
[Dear  ___] : Dear  [unfilled], [I recently saw our patient [unfilled] for a follow-up visit.] : I recently saw our patient, [unfilled] for a follow-up visit. [Attached please find my note.] : Attached please find my note. [FreeTextEntry2] : She is receiving chemoradiation and will see me regularly for followup.\par I will keep you updated on her progress. \par Thank you again for referring this lucretia patient.

## 2022-11-08 NOTE — PAST MEDICAL HISTORY
[Surgical Menopause] : The patient is in surgical menopause [Menarche Age ____] : age at menarche was [unfilled] [Menopause Age____] : age at menopause was [unfilled] [Total Preg ___] : G[unfilled] [Live Births ___] : P[unfilled]  [Full Term ___] : Full Term: [unfilled] [Abortions ___] : Abortions:[unfilled] [Living ___] : Living: [unfilled] [AB Spont ___] : miscarriages: [unfilled]

## 2022-11-08 NOTE — HISTORY OF PRESENT ILLNESS
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 Schmidt.\par \par ECC and biopsy 9/15/2021  showed chronic and acute inflammation, reactive atypia, no evidence of dysplasia.\par \par She had an NITIN pap 8/3/21 and follow up colposcopy with biopsies  was negative for dysplasia or cancer.  \par  Pap on 8/8/22 again noted NITIN, favoring neoplasia. HPV negative.\par  8/8/22 Colposcopy with biopsies demonstrated well differentiated endocervical adenocarcinoma on the ECC.  \par \par She denies bleeding or pain and has always worn a tampon and continues to wear one for "cleanliness reasons."   She also takes Premarin PO for hot flashes.\par \par 8/8/22 Colposcopy ([facility]) - task sent for [facility] path review 8/30/22 \par Cervix:  12 o'clock:  atypical endocervical epithelium, squamous mucosa with reactive atypia.  \par ECC:  endocervical adenocarcinoma, well differentiated.  Tumor cells positive for P16 and Ki-67 was increased.  ER and FL were negative.  \par \par 8/24/22 PET CT \par 1.  Hypermetabolic soft tissue mass arising from the LEFT portion of the vaginal cuff, consistent with malignancy.\par 2.  Nonspecific findings in the skeleton; there is an area of non--periarticular uptake within L1 where malignancy is not excluded. Remaining findings are indeterminate but are favored to be degenerative. MRI may be helpful.\par 3.  Micronodularity along the LEFT oblique fissure, likely inflammatory.\par \par MRI pelvis 9/12/22 In a patient with positive biopsy of the cervical stump there appears be prominent cervix with cystic change without measurable mass. There is no evidence of extension into surrounding parametrium. No adenopathy can be seen.\par \par She is seen for OTV #20/25. + fatigue.. Loose stools alternating with constipation. Otherwise well. CIsplatin Q Thursday with bloodwork. Met with Dr. Colmenares and Issa Dawson to plan her sleeve placement and brachytherapy. Reports mild pelvic discomfort and fatigue.

## 2022-11-08 NOTE — PHYSICAL EXAM
[Chaperone Present] : A chaperone was present in the examining room during all aspects of the physical examination [Absent] : Adnexa(ae): Absent [Abnormal] : Bimanual Exam: Abnormal [Normal] : Recto-Vaginal Exam: Normal [de-identified] :  scar (Pfannenstiel) [de-identified] : clinically, cervical tumor is decreased in size from initial exam, visible external os; friable tumor remains; cervix is replaced by tumor, a barrel-cervix, with shortening of the left parametrium, thethering cervix to left with resultant loss of the left vaginal fornix on exam. Right parametrium is free.  [de-identified] : RV septum free [Fully active, able to carry on all pre-disease performance without restriction] : Status 0 - Fully active, able to carry on all pre-disease performance without restriction

## 2022-11-08 NOTE — HISTORY OF PRESENT ILLNESS
[FreeTextEntry1] : Ms. Gonzalez, 77 years old, was referred by Dr. Faulkner (PCP) and Dr. 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  \par Follow up pap on 22 again noted NITIN, favoring neoplasia.  \par Colpo with biopsies 22 identified well differentiated endocervical adenocarcinoma on the ECC.  [facility] review: confirmatory of HPV-associated endocervical adenocarcinoma. \par \par Initial GYN ONC consultation: 22\par 22: MRI report indicates no radiographic parametrial invasion, however this is present by definition based on the exam findings and the recommendation remains for chemoradiation\par \par \par She has tolerated chemoradiation treatment with tolerable side effects, she completes EBRT next week and is scheduled for Sandro sleeve placement 22. She states she has some vulvar itching but denies any VB or other associated signs or symptoms. \par \par PMH:  Bell's Palsy 2018 (resolved but has hyperacute hearing bilaterally); selective claustrophobia and phobia of asphyxiation which affects her ability to wear a standard mask.  \par PSH:  tonsillectomy (age 19);  ();  tubal ligation with TOP (); supracervical hysterectomy BSO for fibroids (); varicose vein ligation ();  blepharoplasty (); right breast biopsy - benign (cannot recall year).\par \par PAP:\par 22 ASCUS / atypical glandular cells, favor neoplastic. HPV negative.   ([facility])\par 8/3/21:  atypical glandular cells.  Abnormal cytological findings noted.  ([facility])\par \par Pathology:\par 22 Colposcopy ([facility]) - task sent for [facility] path review 22\par Cervix:  12 o'clock:  atypical endocervical epithelium, squamous mucosa with reactive atypia.  \par ECC:  endocervical adenocarcinoma, well differentiated.  Tumor cells positive for P16 and Ki-67 was increased.  ER and MD were negative.  \par \par 9/15/21 Colposcopy [facility]) \par ECC:  endocervical tissue and squamous epithelium with chronic and acute inflammation and reactive atypia.  No evidence of dysplasia.  \par Cervix:  6 o'clock:  Squamous mucosa with chronic inflammation and reactive atypia.  No evidence of dysplasia.  \par Cervix 12 o'clock:  Squamous mucosa with chronic inflammation and reactive atypia.  No evidence of dysplasia.  \par \par Imaging:\par 22 PET CT ([facility]) Indication:  Cervical Cancer\par HEAD/NECK: Physiologic FDG activity in visualized brain, head, and neck.\par CHEST: NO abnormal FDG activity. NO lymphadenopathy. Atherosclerotic calcification of nonaneurysmal thoracic aorta.\par LUNGS: 0.3 cm nodule along the LEFT oblique fissure, below resolution of PET without ABNORMAL uptake. Lung fields otherwise unremarkable.\par PLEURA/PERICARDIUM: NO abnormal FDG activity. NO effusions.\par HEPATOBILIARY/PANCREAS: Physiologic FDG activity. Liver SUV mean is 3.5.\par SPLEEN:  Physiologic FDG activity. NORMAL size.\par ADRENAL GLANDS: NO abnormal FDG activity.\par KIDNEYS/URINARY BLADDER: Physiologic excreted FDG activity.\par REPRODUCTIVE ORGANS: Status-post DIANA/BSO. Arising from the LEFT aspect of the vaginal cuff is a 5.5 x 3.5 cm soft tissue mass, with heterogeneous, moderately intense uptake (SUV 5.3, image 223). This measures approximately 4.8 cm on sagittal imaging.\par ABDOMINOPELVIC NODES: NO enlarged or FDG-avid lymph node.\par BOWEL/PERITONEUM/MESENTERY: NO abnormal FDG activity. There is a small hiatal hernia. Oral contrast has reached cecum.\par ABDOMINAL WALL: NO hypermetabolic periumbilical ("Sister Missy Jacques") nodes/nodules. NO abnormal FDG activity otherwise noted at abdominal wall.\par BONES/SOFT TISSUES/VESSELS: There is a focus seen within L1 at its LEFT anterolateral aspect where there is some mild change in the marrow in this does not appear to be strictly periarticular (SUV 3.8, image 140). Additional foci seen in the spine otherwise may be degenerative. For example, a focus with  SUV 4.4, posterolateral portion of L1 vertebral body at the inferior endplate appears to be adjacent to the pedicle, but on sagittal imaging, there is marked degenerative change seen there, with a possible Schmorl's node. There are punctate foci of increased uptake seen at the pelvic bones, but these largely appear to be periarticular and may be degenerative (i.e. SUV 3.8, image 182).  Atherosclerotic calcification of nonaneurysmal abdominal aorta including visceral and/or runoff branches.\par IMPRESSION:\par 1.  Hypermetabolic soft tissue mass arising from the LEFT portion of the vaginal cuff, consistent with malignancy.\par 2.  Nonspecific findings in the skeleton; there is an area of non--periarticular uptake within L1 where malignancy is not excluded. Remaining findings are indeterminate but are favored to be degenerative. MRI may be helpful.\par 3.  Micronodularity along the LEFT oblique fissure, likely inflammatory.\par \par [facility] Pathology:  2002:\par 1) skin:  abdominal scar\par 2)  Left adnexa:  ovary and fallopian tube with no significant pathologic alternations.\par 3)  Right Adnexa:  simple cyst of ovary;  fallopian tube with mild patchy chronic inflammation and reactive changes;  paratubal cyst.\par 4)  Uterus:  Inactive basal layer endometrium;  leiomyomata, subserosal.  \par \par Health Maintenance:\par BMI: 21\par COVID vaccine received:  Yes + booster\par Mammogram:  22 BIRADS 2 ([facility])\par Colonoscopy: 2020 \par PAP: See above\par \par GYN/Ref:  Ariadna Espinosa DO\par PCP:  Dr. Yahaira Faulkner\par GI:  Dr. Erik Marroquin \par

## 2022-11-09 ENCOUNTER — NON-APPOINTMENT (OUTPATIENT)
Age: 77
End: 2022-11-09

## 2022-11-09 ENCOUNTER — OUTPATIENT (OUTPATIENT)
Dept: OUTPATIENT SERVICES | Facility: HOSPITAL | Age: 77
LOS: 1 days | End: 2022-11-09
Payer: MEDICARE

## 2022-11-09 ENCOUNTER — LABORATORY RESULT (OUTPATIENT)
Age: 77
End: 2022-11-09

## 2022-11-09 ENCOUNTER — APPOINTMENT (OUTPATIENT)
Dept: MRI IMAGING | Facility: CLINIC | Age: 77
End: 2022-11-09

## 2022-11-09 ENCOUNTER — APPOINTMENT (OUTPATIENT)
Dept: FAMILY MEDICINE | Facility: CLINIC | Age: 77
End: 2022-11-09

## 2022-11-09 VITALS
DIASTOLIC BLOOD PRESSURE: 70 MMHG | WEIGHT: 139 LBS | TEMPERATURE: 94.8 F | SYSTOLIC BLOOD PRESSURE: 120 MMHG | HEART RATE: 62 BPM | HEIGHT: 64 IN | BODY MASS INDEX: 23.73 KG/M2 | OXYGEN SATURATION: 93 %

## 2022-11-09 DIAGNOSIS — C53.0 MALIGNANT NEOPLASM OF ENDOCERVIX: ICD-10-CM

## 2022-11-09 DIAGNOSIS — C53.8 MALIGNANT NEOPLASM OF OVERLAPPING SITES OF CERVIX UTERI: ICD-10-CM

## 2022-11-09 PROCEDURE — 36415 COLL VENOUS BLD VENIPUNCTURE: CPT

## 2022-11-09 PROCEDURE — 77427 RADIATION TX MANAGEMENT X5: CPT

## 2022-11-09 PROCEDURE — G6015: CPT

## 2022-11-09 PROCEDURE — 93000 ELECTROCARDIOGRAM COMPLETE: CPT

## 2022-11-09 PROCEDURE — G6002: CPT

## 2022-11-09 PROCEDURE — 72197 MRI PELVIS W/O & W/DYE: CPT | Mod: 26,MH

## 2022-11-09 PROCEDURE — 99214 OFFICE O/P EST MOD 30 MIN: CPT | Mod: 25

## 2022-11-09 PROCEDURE — A9585: CPT

## 2022-11-09 PROCEDURE — 72197 MRI PELVIS W/O & W/DYE: CPT

## 2022-11-09 RX ORDER — PROCHLORPERAZINE MALEATE 10 MG/1
10 TABLET ORAL EVERY 6 HOURS
Qty: 120 | Refills: 2 | Status: DISCONTINUED | COMMUNITY
Start: 2022-09-26 | End: 2022-11-09

## 2022-11-09 RX ORDER — METAPROTERENOL SULFATE 10 MG
500 TABLET ORAL
Qty: 180 | Refills: 2 | Status: DISCONTINUED | COMMUNITY
Start: 2021-05-26 | End: 2022-11-09

## 2022-11-09 RX ORDER — ONDANSETRON 8 MG/1
8 TABLET, ORALLY DISINTEGRATING ORAL
Qty: 90 | Refills: 3 | Status: DISCONTINUED | COMMUNITY
Start: 2022-09-26 | End: 2022-11-09

## 2022-11-10 ENCOUNTER — RESULT REVIEW (OUTPATIENT)
Age: 77
End: 2022-11-10

## 2022-11-10 ENCOUNTER — APPOINTMENT (OUTPATIENT)
Dept: INFUSION THERAPY | Facility: CLINIC | Age: 77
End: 2022-11-10

## 2022-11-10 LAB
ALBUMIN SERPL ELPH-MCNC: 4.3 G/DL — SIGNIFICANT CHANGE UP (ref 3.3–5)
ALP SERPL-CCNC: 97 U/L — SIGNIFICANT CHANGE UP (ref 40–120)
ALT FLD-CCNC: 18 U/L — SIGNIFICANT CHANGE UP (ref 10–45)
ANION GAP SERPL CALC-SCNC: 13 MMOL/L — SIGNIFICANT CHANGE UP (ref 5–17)
AST SERPL-CCNC: 17 U/L — SIGNIFICANT CHANGE UP (ref 10–40)
BASOPHILS # BLD AUTO: 0.02 K/UL — SIGNIFICANT CHANGE UP (ref 0–0.2)
BASOPHILS NFR BLD AUTO: 0.5 % — SIGNIFICANT CHANGE UP (ref 0–2)
BILIRUB SERPL-MCNC: 0.4 MG/DL — SIGNIFICANT CHANGE UP (ref 0.2–1.2)
BUN SERPL-MCNC: 14 MG/DL — SIGNIFICANT CHANGE UP (ref 7–23)
CALCIUM SERPL-MCNC: 9.1 MG/DL — SIGNIFICANT CHANGE UP (ref 8.4–10.5)
CHLORIDE SERPL-SCNC: 100 MMOL/L — SIGNIFICANT CHANGE UP (ref 96–108)
CO2 SERPL-SCNC: 24 MMOL/L — SIGNIFICANT CHANGE UP (ref 22–31)
CREAT SERPL-MCNC: 0.86 MG/DL — SIGNIFICANT CHANGE UP (ref 0.5–1.3)
EGFR: 70 ML/MIN/1.73M2 — SIGNIFICANT CHANGE UP
EOSINOPHIL # BLD AUTO: 0.16 K/UL — SIGNIFICANT CHANGE UP (ref 0–0.5)
EOSINOPHIL NFR BLD AUTO: 3.8 % — SIGNIFICANT CHANGE UP (ref 0–6)
GLUCOSE SERPL-MCNC: 90 MG/DL — SIGNIFICANT CHANGE UP (ref 70–99)
HCT VFR BLD CALC: 31.8 % — LOW (ref 34.5–45)
HGB BLD-MCNC: 11.2 G/DL — LOW (ref 11.5–15.5)
IMM GRANULOCYTES NFR BLD AUTO: 1.4 % — HIGH (ref 0–0.9)
LYMPHOCYTES # BLD AUTO: 0.37 K/UL — LOW (ref 1–3.3)
LYMPHOCYTES # BLD AUTO: 8.7 % — LOW (ref 13–44)
MAGNESIUM SERPL-MCNC: 1.7 MG/DL — SIGNIFICANT CHANGE UP (ref 1.6–2.6)
MCHC RBC-ENTMCNC: 29.9 PG — SIGNIFICANT CHANGE UP (ref 27–34)
MCHC RBC-ENTMCNC: 35.2 GM/DL — SIGNIFICANT CHANGE UP (ref 32–36)
MCV RBC AUTO: 84.8 FL — SIGNIFICANT CHANGE UP (ref 80–100)
MONOCYTES # BLD AUTO: 0.45 K/UL — SIGNIFICANT CHANGE UP (ref 0–0.9)
MONOCYTES NFR BLD AUTO: 10.6 % — SIGNIFICANT CHANGE UP (ref 2–14)
NEUTROPHILS # BLD AUTO: 3.18 K/UL — SIGNIFICANT CHANGE UP (ref 1.8–7.4)
NEUTROPHILS NFR BLD AUTO: 75 % — SIGNIFICANT CHANGE UP (ref 43–77)
NRBC # BLD: 0 /100 WBCS — SIGNIFICANT CHANGE UP (ref 0–0)
PLATELET # BLD AUTO: 153 K/UL — SIGNIFICANT CHANGE UP (ref 150–400)
POTASSIUM SERPL-MCNC: 3.5 MMOL/L — SIGNIFICANT CHANGE UP (ref 3.5–5.3)
POTASSIUM SERPL-SCNC: 3.5 MMOL/L — SIGNIFICANT CHANGE UP (ref 3.5–5.3)
PROT SERPL-MCNC: 6.7 G/DL — SIGNIFICANT CHANGE UP (ref 6–8.3)
RBC # BLD: 3.75 M/UL — LOW (ref 3.8–5.2)
RBC # FLD: 12.8 % — SIGNIFICANT CHANGE UP (ref 10.3–14.5)
SODIUM SERPL-SCNC: 138 MMOL/L — SIGNIFICANT CHANGE UP (ref 135–145)
WBC # BLD: 4.24 K/UL — SIGNIFICANT CHANGE UP (ref 3.8–10.5)
WBC # FLD AUTO: 4.24 K/UL — SIGNIFICANT CHANGE UP (ref 3.8–10.5)

## 2022-11-10 PROCEDURE — G6015: CPT

## 2022-11-10 PROCEDURE — G6002: CPT

## 2022-11-10 NOTE — PLAN
[FreeTextEntry1] : -Patient optimized for surgery pending normal BW.\par -Patient advised to remain NPO after midnight prior to surgery. \par -Risks/benefits of surgery have been discussed by operative physician. \par -Patient has been advised to avoid aspirin or aspirin containing products from now until surgery. \par -Thank you for including me in the care of your patient.\par

## 2022-11-10 NOTE — HISTORY OF PRESENT ILLNESS
[No Pertinent Cardiac History] : no history of aortic stenosis, atrial fibrillation, coronary artery disease, recent myocardial infarction, or implantable device/pacemaker [No Pertinent Pulmonary History] : no history of asthma, COPD, sleep apnea, or smoking [No Adverse Anesthesia Reaction] : no adverse anesthesia reaction in self or family member [(Patient denies any chest pain, claudication, dyspnea on exertion, orthopnea, palpitations or syncope)] : Patient denies any chest pain, claudication, dyspnea on exertion, orthopnea, palpitations or syncope [Chronic Anticoagulation] : no chronic anticoagulation [Chronic Kidney Disease] : no chronic kidney disease [Diabetes] : no diabetes [FreeTextEntry1] : Sandro Sleeve [FreeTextEntry2] : 11/14/2022 [FreeTextEntry3] : Dr. Marj Schmidt [FreeTextEntry4] : MALISSA MURCIA is a 77 year old female with endocervical adenocarcinoma presenting for preop clearance.

## 2022-11-10 NOTE — CONSULT LETTER
[Dear  ___] : Dear ~KEYANA, [Please see my note below.] : Please see my note below. [Consult Closing:] : Thank you very much for allowing me to participate in the care of this patient.  If you have any questions, please do not hesitate to contact me. [Sincerely,] : Sincerely, [FreeTextEntry3] : \par Zaid Kwan DO\par Diplomate of [employer]\par  at [employer] at [facility]\par

## 2022-11-10 NOTE — ADDENDUM
[FreeTextEntry1] : Blood work reviewed. Patient likely has anemia of chronic disease. Can have iron studies at f/u. Cleared for surgery.

## 2022-11-11 ENCOUNTER — OUTPATIENT (OUTPATIENT)
Dept: OUTPATIENT SERVICES | Facility: HOSPITAL | Age: 77
LOS: 1 days | End: 2022-11-11
Payer: MEDICARE

## 2022-11-11 ENCOUNTER — LABORATORY RESULT (OUTPATIENT)
Age: 77
End: 2022-11-11

## 2022-11-11 ENCOUNTER — APPOINTMENT (OUTPATIENT)
Dept: HEMATOLOGY ONCOLOGY | Facility: CLINIC | Age: 77
End: 2022-11-11

## 2022-11-11 VITALS
RESPIRATION RATE: 16 BRPM | OXYGEN SATURATION: 100 % | SYSTOLIC BLOOD PRESSURE: 123 MMHG | HEART RATE: 69 BPM | DIASTOLIC BLOOD PRESSURE: 61 MMHG | HEIGHT: 63 IN | TEMPERATURE: 98 F | WEIGHT: 145.95 LBS

## 2022-11-11 VITALS — HEIGHT: 64 IN | WEIGHT: 146.13 LBS | BODY MASS INDEX: 24.95 KG/M2

## 2022-11-11 DIAGNOSIS — Z98.891 HISTORY OF UTERINE SCAR FROM PREVIOUS SURGERY: Chronic | ICD-10-CM

## 2022-11-11 DIAGNOSIS — Z90.710 ACQUIRED ABSENCE OF BOTH CERVIX AND UTERUS: Chronic | ICD-10-CM

## 2022-11-11 DIAGNOSIS — Z98.51 TUBAL LIGATION STATUS: Chronic | ICD-10-CM

## 2022-11-11 DIAGNOSIS — Z98.890 OTHER SPECIFIED POSTPROCEDURAL STATES: Chronic | ICD-10-CM

## 2022-11-11 DIAGNOSIS — C53.8 MALIGNANT NEOPLASM OF OVERLAPPING SITES OF CERVIX UTERI: ICD-10-CM

## 2022-11-11 DIAGNOSIS — Z01.818 ENCOUNTER FOR OTHER PREPROCEDURAL EXAMINATION: ICD-10-CM

## 2022-11-11 PROCEDURE — G6002: CPT

## 2022-11-11 PROCEDURE — 99214 OFFICE O/P EST MOD 30 MIN: CPT | Mod: 25

## 2022-11-11 PROCEDURE — G6015: CPT

## 2022-11-11 NOTE — H&P PST ADULT - NSICDXFAMILYHX_GEN_ALL_CORE_FT
FAMILY HISTORY:  Father  Still living? No  Family history of liver cancer, Age at diagnosis: Age Unknown    Mother  Still living? No  Family history of Alzheimer's disease, Age at diagnosis: Age Unknown

## 2022-11-11 NOTE — H&P PST ADULT - NSANTHOSAYNRD_GEN_A_CORE
No. TABATHA screening performed.  STOP BANG Legend: 0-2 = LOW Risk; 3-4 = INTERMEDIATE Risk; 5-8 = HIGH Risk

## 2022-11-11 NOTE — H&P PST ADULT - GENITOURINARY
Called pt no answer couldn't leave message     ----- Message from Julian Merchant sent at 6/3/2020 12:17 PM CDT -----  Contact: self  Pt is requesting a call back, no reason was given.    Contact Info [phone] (home)        details…

## 2022-11-11 NOTE — H&P PST ADULT - NSICDXPASTSURGICALHX_GEN_ALL_CORE_FT
PAST SURGICAL HISTORY:  H/O: hysterectomy     History of  section     History of dilatation and curettage due to miscarraige    S/P tubal ligation 1985

## 2022-11-11 NOTE — H&P PST ADULT - ASSESSMENT
77 years old female present to PST prior to insertion of EDDIE sleeve with Dr. Schmidt.    Plan   1. NPO as per ASU  2. Covid swab done 11/11/2022  3. Drink a quart of extra  fluids the day before your surgery.  4. Medical optimization for surgery with Dr. Mar. On chart  7. CBC, BMP on chart   8. EKG on chart

## 2022-11-11 NOTE — H&P PST ADULT - HISTORY OF PRESENT ILLNESS
77 years old female with carcinoma of cervical stump. History significant for claustrophobia, HSV-2. She had an abnormal pap smear in 2021, biopsy was without carcinoma. She had a pap smear followed by a biopsy  8/ 2022.  She has since had 5/6 chemotherapy and radiation treatments.  Planned brachytherapy. Planned insertion of EDDIE sleeve.  77 years old female with carcinoma of cervical stump. History significant for claustrophobia, HSV-2. She had an abnormal pap smear in 2021, biopsy was without carcinoma. She had a pap smear followed by a biopsy  8/ 2022  showing carcinoma.  She has since had 5/6 chemotherapy and radiation treatments. Planned insertion of EDDIE sleeve for brachytherapy.

## 2022-11-11 NOTE — H&P PST ADULT - CIGARETTES, PACKS/DAY
0.75 78 yo with a known EMP, admitted for hysteroscopic resection and d&c----rbas reviewed---dewey arroyo md

## 2022-11-11 NOTE — H&P PST ADULT - NSICDXPASTMEDICALHX_GEN_ALL_CORE_FT
PAST MEDICAL HISTORY:  Bilateral tinnitus     Carcinoma of cervical stump radiation and chemotherapy    Claustrophobia     Constipation     Diarrhea     H/O: Bell's palsy     Hemorrhoids     History of colon polyps     HSV-2 infection     Seasonal allergies     Subclinical hypothyroidism

## 2022-11-12 DIAGNOSIS — C53.8 MALIGNANT NEOPLASM OF OVERLAPPING SITES OF CERVIX UTERI: ICD-10-CM

## 2022-11-12 DIAGNOSIS — Z01.818 ENCOUNTER FOR OTHER PREPROCEDURAL EXAMINATION: ICD-10-CM

## 2022-11-12 LAB
ALBUMIN SERPL ELPH-MCNC: 4 G/DL
ALP BLD-CCNC: 84 U/L
ALT SERPL-CCNC: 18 U/L
ANION GAP SERPL CALC-SCNC: 13 MMOL/L
AST SERPL-CCNC: 17 U/L
BASOPHILS # BLD AUTO: 0.03 K/UL
BASOPHILS NFR BLD AUTO: 0.7 %
BILIRUB SERPL-MCNC: 0.3 MG/DL
BUN SERPL-MCNC: 16 MG/DL
CALCIUM SERPL-MCNC: 8.9 MG/DL
CHLORIDE SERPL-SCNC: 103 MMOL/L
CO2 SERPL-SCNC: 23 MMOL/L
CREAT SERPL-MCNC: 0.84 MG/DL
EGFR: 72 ML/MIN/1.73M2
EOSINOPHIL # BLD AUTO: 0.15 K/UL
EOSINOPHIL NFR BLD AUTO: 3.7 %
GLUCOSE SERPL-MCNC: 87 MG/DL
HCT VFR BLD CALC: 33.3 %
HGB BLD-MCNC: 10.9 G/DL
IMM GRANULOCYTES NFR BLD AUTO: 0.5 %
LYMPHOCYTES # BLD AUTO: 0.41 K/UL
LYMPHOCYTES NFR BLD AUTO: 10 %
MAN DIFF?: NORMAL
MCHC RBC-ENTMCNC: 29.3 PG
MCHC RBC-ENTMCNC: 32.7 GM/DL
MCV RBC AUTO: 89.5 FL
MONOCYTES # BLD AUTO: 0.48 K/UL
MONOCYTES NFR BLD AUTO: 11.7 %
NEUTROPHILS # BLD AUTO: 3 K/UL
NEUTROPHILS NFR BLD AUTO: 73.4 %
PLATELET # BLD AUTO: 149 K/UL
POTASSIUM SERPL-SCNC: 3.6 MMOL/L
PROT SERPL-MCNC: 6.2 G/DL
RBC # BLD: 3.72 M/UL
RBC # FLD: 13.3 %
SODIUM SERPL-SCNC: 139 MMOL/L
WBC # FLD AUTO: 4.09 K/UL

## 2022-11-14 ENCOUNTER — OUTPATIENT (OUTPATIENT)
Dept: INPATIENT UNIT | Facility: HOSPITAL | Age: 77
LOS: 1 days | Discharge: HOME | End: 2022-11-14
Payer: MEDICARE

## 2022-11-14 ENCOUNTER — TRANSCRIPTION ENCOUNTER (OUTPATIENT)
Age: 77
End: 2022-11-14

## 2022-11-14 ENCOUNTER — APPOINTMENT (OUTPATIENT)
Dept: GYNECOLOGIC ONCOLOGY | Facility: HOSPITAL | Age: 77
End: 2022-11-14

## 2022-11-14 VITALS
RESPIRATION RATE: 14 BRPM | DIASTOLIC BLOOD PRESSURE: 68 MMHG | HEART RATE: 73 BPM | TEMPERATURE: 97 F | OXYGEN SATURATION: 98 % | SYSTOLIC BLOOD PRESSURE: 133 MMHG

## 2022-11-14 VITALS
OXYGEN SATURATION: 100 % | SYSTOLIC BLOOD PRESSURE: 135 MMHG | RESPIRATION RATE: 16 BRPM | HEART RATE: 80 BPM | TEMPERATURE: 98 F | WEIGHT: 145.95 LBS | DIASTOLIC BLOOD PRESSURE: 54 MMHG | HEIGHT: 63 IN

## 2022-11-14 DIAGNOSIS — Z98.891 HISTORY OF UTERINE SCAR FROM PREVIOUS SURGERY: Chronic | ICD-10-CM

## 2022-11-14 DIAGNOSIS — Z90.710 ACQUIRED ABSENCE OF BOTH CERVIX AND UTERUS: Chronic | ICD-10-CM

## 2022-11-14 DIAGNOSIS — Z98.51 TUBAL LIGATION STATUS: Chronic | ICD-10-CM

## 2022-11-14 DIAGNOSIS — Z98.890 OTHER SPECIFIED POSTPROCEDURAL STATES: Chronic | ICD-10-CM

## 2022-11-14 DIAGNOSIS — C53.8 MALIGNANT NEOPLASM OF OVERLAPPING SITES OF CERVIX UTERI: ICD-10-CM

## 2022-11-14 LAB
ANION GAP SERPL CALC-SCNC: 6 MMOL/L — SIGNIFICANT CHANGE UP (ref 5–17)
BUN SERPL-MCNC: 16 MG/DL — SIGNIFICANT CHANGE UP (ref 7–23)
CALCIUM SERPL-MCNC: 8.3 MG/DL — LOW (ref 8.5–10.1)
CHLORIDE SERPL-SCNC: 104 MMOL/L — SIGNIFICANT CHANGE UP (ref 96–108)
CO2 SERPL-SCNC: 26 MMOL/L — SIGNIFICANT CHANGE UP (ref 22–31)
CREAT SERPL-MCNC: 0.91 MG/DL — SIGNIFICANT CHANGE UP (ref 0.5–1.3)
EGFR: 65 ML/MIN/1.73M2 — SIGNIFICANT CHANGE UP
GLUCOSE SERPL-MCNC: 94 MG/DL — SIGNIFICANT CHANGE UP (ref 70–99)
HCT VFR BLD CALC: 31.2 % — LOW (ref 34.5–45)
HGB BLD-MCNC: 11 G/DL — LOW (ref 11.5–15.5)
MCHC RBC-ENTMCNC: 30.4 PG — SIGNIFICANT CHANGE UP (ref 27–34)
MCHC RBC-ENTMCNC: 35.3 GM/DL — SIGNIFICANT CHANGE UP (ref 32–36)
MCV RBC AUTO: 86.2 FL — SIGNIFICANT CHANGE UP (ref 80–100)
PLATELET # BLD AUTO: 154 K/UL — SIGNIFICANT CHANGE UP (ref 150–400)
POTASSIUM SERPL-MCNC: 3.3 MMOL/L — LOW (ref 3.5–5.3)
POTASSIUM SERPL-SCNC: 3.3 MMOL/L — LOW (ref 3.5–5.3)
RBC # BLD: 3.62 M/UL — LOW (ref 3.8–5.2)
RBC # FLD: 13.2 % — SIGNIFICANT CHANGE UP (ref 10.3–14.5)
SODIUM SERPL-SCNC: 136 MMOL/L — SIGNIFICANT CHANGE UP (ref 135–145)
WBC # BLD: 3.61 K/UL — LOW (ref 3.8–10.5)
WBC # FLD AUTO: 3.61 K/UL — LOW (ref 3.8–10.5)

## 2022-11-14 PROCEDURE — G6002: CPT

## 2022-11-14 PROCEDURE — G6015: CPT

## 2022-11-14 PROCEDURE — 36415 COLL VENOUS BLD VENIPUNCTURE: CPT

## 2022-11-14 PROCEDURE — 85027 COMPLETE CBC AUTOMATED: CPT

## 2022-11-14 PROCEDURE — 80048 BASIC METABOLIC PNL TOTAL CA: CPT

## 2022-11-14 PROCEDURE — 57156 INS VAG BRACHYTX DEVICE: CPT

## 2022-11-14 RX ORDER — PREGABALIN 225 MG/1
1 CAPSULE ORAL
Qty: 0 | Refills: 0 | DISCHARGE

## 2022-11-14 RX ORDER — L.ACIDOPH/B.ANIMALIS/B.LONGUM 15B CELL
1 CAPSULE ORAL
Qty: 0 | Refills: 0 | DISCHARGE

## 2022-11-14 RX ORDER — CHOLECALCIFEROL (VITAMIN D3) 125 MCG
1 CAPSULE ORAL
Qty: 0 | Refills: 0 | DISCHARGE

## 2022-11-14 RX ORDER — SODIUM CHLORIDE 9 MG/ML
1000 INJECTION, SOLUTION INTRAVENOUS
Refills: 0 | Status: DISCONTINUED | OUTPATIENT
Start: 2022-11-14 | End: 2022-11-14

## 2022-11-14 RX ORDER — ONDANSETRON 8 MG/1
4 TABLET, FILM COATED ORAL ONCE
Refills: 0 | Status: DISCONTINUED | OUTPATIENT
Start: 2022-11-14 | End: 2022-11-14

## 2022-11-14 RX ORDER — FENTANYL CITRATE 50 UG/ML
50 INJECTION INTRAVENOUS
Refills: 0 | Status: DISCONTINUED | OUTPATIENT
Start: 2022-11-14 | End: 2022-11-14

## 2022-11-14 RX ORDER — OXYCODONE HYDROCHLORIDE 5 MG/1
5 TABLET ORAL ONCE
Refills: 0 | Status: DISCONTINUED | OUTPATIENT
Start: 2022-11-14 | End: 2022-11-14

## 2022-11-14 NOTE — ASU DISCHARGE PLAN (ADULT/PEDIATRIC) - COMMENTS
The Sandro sleeve will be removed by Dr. Colmenares at the completion of radiation. You do not need a postop visit in Dr. Estrella office this month, but you should see her for followup in about 3 months. Call the office with any concerns, however.

## 2022-11-14 NOTE — ASU DISCHARGE PLAN (ADULT/PEDIATRIC) - CARE PROVIDER_API CALL
Marj Schmidt)  Gynecologic Oncology; Obstetrics and Gynecology  [address] [place] [address]  Phone: [phone]  Fax: [phone]  Established Patient  Follow Up Time:

## 2022-11-14 NOTE — ASU DISCHARGE PLAN (ADULT/PEDIATRIC) - NS MD DC FALL RISK RISK
For information on Fall & Injury Prevention, visit: [email or web address] OR  [email or web address] OR  https://www.cdc.gov/steadi/patient.html

## 2022-11-15 ENCOUNTER — NON-APPOINTMENT (OUTPATIENT)
Age: 77
End: 2022-11-15

## 2022-11-15 PROCEDURE — 77336 RADIATION PHYSICS CONSULT: CPT

## 2022-11-15 PROCEDURE — G6015: CPT

## 2022-11-15 PROCEDURE — 77014: CPT

## 2022-11-15 NOTE — DISEASE MANAGEMENT
[Pathological] : TNM Stage: p [IIA] : IIA [TTNM] : 2a2 [NTNM] : 0 [MTNM] : 0 [de-identified] : 9307 [de-identified] : 8182 [de-identified] : Pelvis

## 2022-11-15 NOTE — HISTORY OF PRESENT ILLNESS
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 Schmidt.\par \par ECC and biopsy 9/15/2021  showed chronic and acute inflammation, reactive atypia, no evidence of dysplasia.\par \par She had an NITIN pap 8/3/21 and follow up colposcopy with biopsies  was negative for dysplasia or cancer.  \par  Pap on 8/8/22 again noted NITIN, favoring neoplasia. HPV negative.\par  8/8/22 Colposcopy with biopsies demonstrated well differentiated endocervical adenocarcinoma on the ECC.  \par \par She denies bleeding or pain and has always worn a tampon and continues to wear one for "cleanliness reasons."   She also takes Premarin PO for hot flashes.\par \par 8/8/22 Colposcopy ([facility]) - task sent for [facility] path review 8/30/22 \par Cervix:  12 o'clock:  atypical endocervical epithelium, squamous mucosa with reactive atypia.  \par ECC:  endocervical adenocarcinoma, well differentiated.  Tumor cells positive for P16 and Ki-67 was increased.  ER and TN were negative.  \par \par 8/24/22 PET CT \par 1.  Hypermetabolic soft tissue mass arising from the LEFT portion of the vaginal cuff, consistent with malignancy.\par 2.  Nonspecific findings in the skeleton; there is an area of non--periarticular uptake within L1 where malignancy is not excluded. Remaining findings are indeterminate but are favored to be degenerative. MRI may be helpful.\par 3.  Micronodularity along the LEFT oblique fissure, likely inflammatory.\par \par MRI pelvis 9/12/22 In a patient with positive biopsy of the cervical stump there appears be prominent cervix with cystic change without measurable mass. There is no evidence of extension into surrounding parametrium. No adenopathy can be seen.\par \par She is seen for OTV #25/25. No new GI symptoms. Otherwise well. CIsplatin Q Thursday with bloodwork. Met with Dr. Colmenares and Issa Dawson and her sleeve placement for brachytherapy was put in yesterday. Reports mild pelvic discomfort and fatigue.

## 2022-11-16 ENCOUNTER — APPOINTMENT (OUTPATIENT)
Dept: INTERVENTIONAL RADIOLOGY/VASCULAR | Facility: CLINIC | Age: 77
End: 2022-11-16

## 2022-11-16 VITALS
HEART RATE: 85 BPM | DIASTOLIC BLOOD PRESSURE: 84 MMHG | OXYGEN SATURATION: 96 % | RESPIRATION RATE: 18 BRPM | SYSTOLIC BLOOD PRESSURE: 133 MMHG | TEMPERATURE: 98.3 F

## 2022-11-16 PROCEDURE — 57155 INSERT UTERI TANDEM/OVOIDS: CPT

## 2022-11-16 PROCEDURE — 77771 HDR RDNCL NTRSTL/ICAV BRCHTX: CPT | Mod: 26

## 2022-11-16 PROCEDURE — 77295 3-D RADIOTHERAPY PLAN: CPT | Mod: 26

## 2022-11-16 NOTE — ASSESSMENT
[FreeTextEntry1] : BRIEF OP NOTE\par DATE/TIME: 11/16/2022 8:42 AM \par PRE-OP DIAGNOSIS: Cervical Cancer\par POST-OP DIAGNOSIS: Cervical cancer\par INDICATION: Cervical cancer, brachytherapy\par PROCEDURE: Examination under anesthesia, insertion of brachytherapy applicator\par PHYSICIAN: OLVIN GRIFFITH \par ASSISTANT: None /\par ANESTHESIA: General/Sedation\par CONTRAST: None\par ESTIMATED BLOOD LOSS: Minimal\par FINDINGS/FOLLOW UP PLAN OF CARE: Sandro sleeve in place.  Stable.  Post-procedure recovery, then to Radiation Medicine for brachytherapy planning and treatment.\par SPECIMENS REMOVED: None \par IMPLANTS: \par Hybrid brachytherapy applicator\par 3 cm tandem, 15'\par multi-channel vaginal cylinder\par interstitial needles placed in channels 2, 3, 4, 6, 7, 8; 3 cm depth\par \par COMPLICATIONS: None\par CONDITION/DISPOSITION: Stable.  To Radiation Medicine.  Dispo home.\par

## 2022-11-16 NOTE — HISTORY OF PRESENT ILLNESS
[FreeTextEntry1] : PRE CALL PRIOR TO APPOINTMENT: \par \par Phone Number:[phone]\par \par Diagnosis: Cervical cancer\par \par COVID-19 SWAB: advised\par \par RX on file: yes\par \par Referring MD: Valdo Colmenares\par \par Appointment date:  11/16/2022\par \par Currently on Chemotherapy:             \par \par CBC within 48 hours:  WBC:       ANC: \par \par Diabetic: no\par \par Clotting or Bleeding disorders: no\par \par PPM / Defibrillator: no\par \par NPO status advised: yes\par \par History of fall:    no\par \par Assistant device for walking: no\par \par  home: spouse / Gregorio\par \par Blood Thinners: no\par \par Prescribing MD agree to have medication held: n/a\par \par Capacity to make decisions: yes\par \par HCP: yes\par \par DNR:no \par \par Person contact for Pre-Call: Spoke with pt on 11/11/22 4848\par \par \par \par \par \par Guidelines for Screening Anesthesia / Sedation Cases	(TYPE YES OR NO) \par  \par Obtain Prescription / Authorization from Referring Physician: YES	\par \par Medical Necessity (Justification of the need for Anesthesia / Sedation) from referring Physician: YES	\par \par Have you taken oral sedation? (e.g. Xanax, Valium, and other oral sedatives):  NO	\par \par Clearance to receive Anesthesia / Sedation: YES- BY Dr. Linton 	\par \par \par \par ANESTHESIA EXCLUSION CRITERIA QUESTIONNAIRE:	\par 	\par Difficult Airway: (TYPE YES OR NO) 	\par          \par Previous Problem with Anesthesia or sedation: NO	\par \par History of Difficult Intubation: NO	\par \par Stridor, Snoring, Sleep Apnea: NO	\par \par Tonsils / Adenoids present: no	\par \par Dysmorphic Facial Features: NO	\par \par Cervical Spine Fusion/ Cervical Spine Surgery: NO	\par \par Tumor in Airway: NO	\par \par Trauma to Airway: NO	\par \par Radiation therapy to head / neck: NO	\par \par Advanced Rheumatoid ArthritiS: NO	\par \par Active Cardiac Ischemia (as defined by EKG or Laboratory  Examination): NO	\par \par  Severe COPD / Home O2: NO	\par \par  Known or Suspected Increased Intracranial pressure: NO	\par \par Patient with BMI of 40 or greater : NO    Weight (kgs.) 63.1_____ Height (ft.) 5'4''______       BMI__23.8_____	 	\par  	\par Patients with Increased Risk of Aspiration: (TYPE YES OR NO) 		\par \par Abnormal Airway: NO	\par \par Hiatal Hernia with Reflux: NO	\par           \par Pregnancy: NO	 	\par           \par Altered Mental State: NO 	 	\par           \par Spinal Cord Injury with Paraplegia or Quadriplegia: NO	 	\par        \par History of delayed Gastric Emptying: NO 	 	\par          \par  ASA Physical Status of 3 or greater (See Appendix 1 from policy ANSL.5502) 	 	\par  	\par \par Malignant Hyperthermia Screening: (TYPE YES OR NO) 	\par           \par Family history unexpected death following anesthesia or exercise: NO	\par          \par  Family or personal history of Malignant Hyperthermia: NO  	\par          \par  A muscle or neuromuscular disorder: NO \par \par Malignant Hyperthermia Screening\par \par Does the patient have a history of any of the following (Type YES or NO)\par \par Malignant hyperthermia: NO\par \par A muscle or neuromuscular disorder: NO \par \par \par Personal history of: (Type YES or NO)\par \par Muscle spasm: NO\par \par Dark or chocolate-colored urine: NO\par \par Unanticipated fever immediately following anesthesia or exercise: NO	\par \par \par \par Valeri -Operative Assessment (Day of Procedure) \par \par Procedure: T&R\par \par Indication: Cervical cancer\par \par NPO status: 11/16/22 0530am (kary Piña aware)\par \par Accompanied by: Gregorio Duke  \par \par Falls risk: no\par \par IVL: #20G L AC by DD \par \par IR MD: Dr. Valdo Colmenares\par \par Urine Pregnancy: n/a\par \par Pre-Op instructions:\par \par POST-OP teaching initiated: yes\par \par Allergy bracelet on: yes adhesive tape\par \par Anesthesia plan discussed with CARY MD: yes\par \par Antibiotic given: n/a\par

## 2022-11-16 NOTE — PHYSICAL EXAM
[Alert] : alert [No Acute Distress] : no acute distress [Well Nourished] : well nourished [Well Developed] : well developed [Normal Sclera/Conjunctiva] : normal sclera/conjunctiva [EOMI] : extra occular movement intact [No Proptosis] : no proptosis [Normal Oropharynx] : the oropharynx was normal [No Respiratory Distress] : no respiratory distress [No Accessory Muscle Use] : no accessory muscle use [Normal Rate] : heart rate was normal  [Normal S1, S2] : normal S1 and S2 [Regular Rhythm] : with a regular rhythm [Pedal Pulses Normal] : the pedal pulses are present [No Edema] : there was no peripheral edema [Normal Bowel Sounds] : normal bowel sounds [Not Tender] : non-tender [Soft] : abdomen soft [Not Distended] : not distended [No Stigmata of Cushings Syndrome] : no stigmata of cushings syndrome [Normal Gait] : normal gait [Normal Strength/Tone] : muscle strength and tone were normal [No Rash] : no rash [Acanthosis Nigricans___] : no acanthosis nigricans [Oriented x3] : oriented to person, place, and time

## 2022-11-17 ENCOUNTER — RESULT REVIEW (OUTPATIENT)
Age: 77
End: 2022-11-17

## 2022-11-17 ENCOUNTER — APPOINTMENT (OUTPATIENT)
Dept: INFUSION THERAPY | Facility: CLINIC | Age: 77
End: 2022-11-17

## 2022-11-17 ENCOUNTER — OUTPATIENT (OUTPATIENT)
Dept: OUTPATIENT SERVICES | Facility: HOSPITAL | Age: 77
LOS: 1 days | Discharge: HOME | End: 2022-11-17

## 2022-11-17 ENCOUNTER — LABORATORY RESULT (OUTPATIENT)
Age: 77
End: 2022-11-17

## 2022-11-17 ENCOUNTER — APPOINTMENT (OUTPATIENT)
Dept: HEMATOLOGY ONCOLOGY | Facility: CLINIC | Age: 77
End: 2022-11-17

## 2022-11-17 VITALS
WEIGHT: 142 LBS | OXYGEN SATURATION: 98 % | TEMPERATURE: 99.2 F | HEIGHT: 64 IN | HEART RATE: 100 BPM | BODY MASS INDEX: 24.24 KG/M2 | SYSTOLIC BLOOD PRESSURE: 163 MMHG | RESPIRATION RATE: 18 BRPM | DIASTOLIC BLOOD PRESSURE: 89 MMHG

## 2022-11-17 DIAGNOSIS — Z90.710 ACQUIRED ABSENCE OF BOTH CERVIX AND UTERUS: Chronic | ICD-10-CM

## 2022-11-17 DIAGNOSIS — C53.9 MALIGNANT NEOPLASM OF CERVIX UTERI, UNSPECIFIED: ICD-10-CM

## 2022-11-17 DIAGNOSIS — Z98.890 OTHER SPECIFIED POSTPROCEDURAL STATES: Chronic | ICD-10-CM

## 2022-11-17 DIAGNOSIS — Z98.51 TUBAL LIGATION STATUS: Chronic | ICD-10-CM

## 2022-11-17 DIAGNOSIS — Z98.891 HISTORY OF UTERINE SCAR FROM PREVIOUS SURGERY: Chronic | ICD-10-CM

## 2022-11-17 LAB
ALBUMIN SERPL ELPH-MCNC: 3.9 G/DL — SIGNIFICANT CHANGE UP (ref 3.3–5)
ALP SERPL-CCNC: 77 U/L — SIGNIFICANT CHANGE UP (ref 40–120)
ALT FLD-CCNC: 8 U/L — LOW (ref 10–45)
ANION GAP SERPL CALC-SCNC: 14 MMOL/L — SIGNIFICANT CHANGE UP (ref 5–17)
AST SERPL-CCNC: 13 U/L — SIGNIFICANT CHANGE UP (ref 10–40)
BASOPHILS # BLD AUTO: 0.01 K/UL — SIGNIFICANT CHANGE UP (ref 0–0.2)
BASOPHILS NFR BLD AUTO: 0.3 % — SIGNIFICANT CHANGE UP (ref 0–2)
BILIRUB SERPL-MCNC: <0.2 MG/DL — SIGNIFICANT CHANGE UP (ref 0.2–1.2)
BUN SERPL-MCNC: 13 MG/DL — SIGNIFICANT CHANGE UP (ref 7–23)
CALCIUM SERPL-MCNC: 8.6 MG/DL — SIGNIFICANT CHANGE UP (ref 8.4–10.5)
CHLORIDE SERPL-SCNC: 107 MMOL/L — SIGNIFICANT CHANGE UP (ref 96–108)
CO2 SERPL-SCNC: 22 MMOL/L — SIGNIFICANT CHANGE UP (ref 22–31)
CREAT SERPL-MCNC: 0.74 MG/DL — SIGNIFICANT CHANGE UP (ref 0.5–1.3)
EGFR: 84 ML/MIN/1.73M2 — SIGNIFICANT CHANGE UP
EOSINOPHIL # BLD AUTO: 0.13 K/UL — SIGNIFICANT CHANGE UP (ref 0–0.5)
EOSINOPHIL NFR BLD AUTO: 3.3 % — SIGNIFICANT CHANGE UP (ref 0–6)
GLUCOSE SERPL-MCNC: 100 MG/DL — HIGH (ref 70–99)
HCT VFR BLD CALC: 29.1 % — LOW (ref 34.5–45)
HGB BLD-MCNC: 10 G/DL — LOW (ref 11.5–15.5)
IMM GRANULOCYTES NFR BLD AUTO: 0.5 % — SIGNIFICANT CHANGE UP (ref 0–0.9)
LYMPHOCYTES # BLD AUTO: 0.33 K/UL — LOW (ref 1–3.3)
LYMPHOCYTES # BLD AUTO: 8.4 % — LOW (ref 13–44)
MAGNESIUM SERPL-MCNC: 1.4 MG/DL — LOW (ref 1.6–2.6)
MCHC RBC-ENTMCNC: 29.7 PG — SIGNIFICANT CHANGE UP (ref 27–34)
MCHC RBC-ENTMCNC: 34.4 GM/DL — SIGNIFICANT CHANGE UP (ref 32–36)
MCV RBC AUTO: 86.4 FL — SIGNIFICANT CHANGE UP (ref 80–100)
MONOCYTES # BLD AUTO: 0.35 K/UL — SIGNIFICANT CHANGE UP (ref 0–0.9)
MONOCYTES NFR BLD AUTO: 8.9 % — SIGNIFICANT CHANGE UP (ref 2–14)
NEUTROPHILS # BLD AUTO: 3.09 K/UL — SIGNIFICANT CHANGE UP (ref 1.8–7.4)
NEUTROPHILS NFR BLD AUTO: 78.6 % — HIGH (ref 43–77)
NRBC # BLD: 0 /100 WBCS — SIGNIFICANT CHANGE UP (ref 0–0)
PLATELET # BLD AUTO: 136 K/UL — LOW (ref 150–400)
POTASSIUM SERPL-MCNC: 3.7 MMOL/L — SIGNIFICANT CHANGE UP (ref 3.5–5.3)
POTASSIUM SERPL-SCNC: 3.7 MMOL/L — SIGNIFICANT CHANGE UP (ref 3.5–5.3)
PROT SERPL-MCNC: 5.9 G/DL — LOW (ref 6–8.3)
RBC # BLD: 3.37 M/UL — LOW (ref 3.8–5.2)
RBC # FLD: 13.3 % — SIGNIFICANT CHANGE UP (ref 10.3–14.5)
SODIUM SERPL-SCNC: 143 MMOL/L — SIGNIFICANT CHANGE UP (ref 135–145)
WBC # BLD: 3.93 K/UL — SIGNIFICANT CHANGE UP (ref 3.8–10.5)
WBC # FLD AUTO: 3.93 K/UL — SIGNIFICANT CHANGE UP (ref 3.8–10.5)

## 2022-11-18 ENCOUNTER — APPOINTMENT (OUTPATIENT)
Dept: HEMATOLOGY ONCOLOGY | Facility: CLINIC | Age: 77
End: 2022-11-18

## 2022-11-18 ENCOUNTER — APPOINTMENT (OUTPATIENT)
Dept: INTERVENTIONAL RADIOLOGY/VASCULAR | Facility: CLINIC | Age: 77
End: 2022-11-18

## 2022-11-18 ENCOUNTER — NON-APPOINTMENT (OUTPATIENT)
Age: 77
End: 2022-11-18

## 2022-11-18 VITALS
HEART RATE: 86 BPM | DIASTOLIC BLOOD PRESSURE: 90 MMHG | TEMPERATURE: 98 F | SYSTOLIC BLOOD PRESSURE: 160 MMHG | RESPIRATION RATE: 16 BRPM | OXYGEN SATURATION: 98 %

## 2022-11-18 DIAGNOSIS — Z88.5 ALLERGY STATUS TO NARCOTIC AGENT: ICD-10-CM

## 2022-11-18 DIAGNOSIS — Z51.11 ENCOUNTER FOR ANTINEOPLASTIC CHEMOTHERAPY: ICD-10-CM

## 2022-11-18 DIAGNOSIS — Z87.891 PERSONAL HISTORY OF NICOTINE DEPENDENCE: ICD-10-CM

## 2022-11-18 DIAGNOSIS — C53.8 MALIGNANT NEOPLASM OF OVERLAPPING SITES OF CERVIX UTERI: ICD-10-CM

## 2022-11-18 DIAGNOSIS — Z90.710 ACQUIRED ABSENCE OF BOTH CERVIX AND UTERUS: ICD-10-CM

## 2022-11-18 DIAGNOSIS — R11.2 NAUSEA WITH VOMITING, UNSPECIFIED: ICD-10-CM

## 2022-11-18 DIAGNOSIS — E86.0 DEHYDRATION: ICD-10-CM

## 2022-11-18 DIAGNOSIS — E03.9 HYPOTHYROIDISM, UNSPECIFIED: ICD-10-CM

## 2022-11-18 PROBLEM — B00.9 HERPESVIRAL INFECTION, UNSPECIFIED: Chronic | Status: ACTIVE | Noted: 2022-11-11

## 2022-11-18 PROBLEM — E03.8 OTHER SPECIFIED HYPOTHYROIDISM: Chronic | Status: ACTIVE | Noted: 2022-11-11

## 2022-11-18 PROBLEM — H93.13 TINNITUS, BILATERAL: Chronic | Status: ACTIVE | Noted: 2022-11-11

## 2022-11-18 PROBLEM — Z86.010 PERSONAL HISTORY OF COLONIC POLYPS: Chronic | Status: ACTIVE | Noted: 2022-11-11

## 2022-11-18 PROBLEM — R19.7 DIARRHEA, UNSPECIFIED: Chronic | Status: ACTIVE | Noted: 2022-11-11

## 2022-11-18 PROBLEM — K64.9 UNSPECIFIED HEMORRHOIDS: Chronic | Status: ACTIVE | Noted: 2022-11-11

## 2022-11-18 PROBLEM — F40.240 CLAUSTROPHOBIA: Chronic | Status: ACTIVE | Noted: 2022-11-11

## 2022-11-18 PROBLEM — Z86.010 PERSONAL HISTORY OF COLON POLYPS: Chronic | Status: ACTIVE | Noted: 2022-11-11

## 2022-11-18 PROBLEM — Z86.69 PERSONAL HISTORY OF OTHER DISEASES OF THE NERVOUS SYSTEM AND SENSE ORGANS: Chronic | Status: ACTIVE | Noted: 2022-11-11

## 2022-11-18 PROBLEM — J30.2 OTHER SEASONAL ALLERGIC RHINITIS: Chronic | Status: ACTIVE | Noted: 2022-11-11

## 2022-11-18 PROBLEM — K59.00 CONSTIPATION, UNSPECIFIED: Chronic | Status: ACTIVE | Noted: 2022-11-11

## 2022-11-18 PROCEDURE — 77295 3-D RADIOTHERAPY PLAN: CPT | Mod: 26

## 2022-11-18 PROCEDURE — 99443: CPT | Mod: 95

## 2022-11-18 PROCEDURE — 77771 HDR RDNCL NTRSTL/ICAV BRCHTX: CPT | Mod: 26

## 2022-11-18 PROCEDURE — 57155 INSERT UTERI TANDEM/OVOIDS: CPT

## 2022-11-18 NOTE — RESULTS/DATA
[FreeTextEntry1] : 9/12/22 MRI Pelvis: In a patient with positive biopsy of the cervical stump there appears be prominent cervix with cystic change without measurable mass. There is no evidence of extension into surrounding parametrium. No adenopathy can be seen.\par \par 9/1/22 Path: PAP: Cervical epithelial cell abnormality. Atypical glandular cells present, favor neoplastic.\par \par 8/24/22 PET: Hypermetabolic soft tissue mass arising from the LEFT portion of the vaginal cuff, consistent with malignancy. Nonspecific findings in the skeleton; there is an area of non--periarticular uptake within L1 where malignancy is not excluded. Remaining findings are indeterminate but are favored to be degenerative. Micronodularity along the LEFT oblique fissure, likely inflammatory.\par Addendum: \par Patient was seen by the referring service on 8/30/2022. Patient was confirmed on physical examination to have a residual cervical stump after supracervical hysterectomy, clinically consistent with an endocervical tumor (?barrel cervix?) on exam, as well as being biopsy-proven. The hypermetabolic soft tissue mass described in the original report is therefore not arising from the vaginal cuff but in fact from the cervical stump. No other changes to original report.\par \par 8/8/22 Path: Cervical biopsy: Atypical endocervical epithelium. Squamous mucosa with reactive atypia. See part B. \par Part B: Endocervical adenocarcinoma, well-differentiated. Tumor cells positive for P16 and Ki-67 was increased. Er and OR were negative.

## 2022-11-18 NOTE — REVIEW OF SYSTEMS
[Anxiety] : anxiety [Negative] : Allergic/Immunologic [Recent Change In Weight] : ~T no recent weight change [Dysphagia] : no dysphagia [Odynophagia] : no odynophagia [Chest Pain] : no chest pain [Shortness Of Breath] : no shortness of breath [Abdominal Pain] : no abdominal pain [de-identified] : fear of asphyxiation

## 2022-11-18 NOTE — HISTORY OF PRESENT ILLNESS
[Home] : at home, [unfilled] , at the time of the visit. 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 She had a colposcopy on 8/8/22 and pathology showed atypical endocervical epithelium. Squamous mucosa with reactive atypia. See part B. Part B: Endocervical adenocarcinoma, well-differentiated. Tumor cells positive for P16 and Ki-67 was increased. Er and NM were negative. She had a PET on 8/24/22 which showed hypermetabolic soft tissue mass arising from the LEFT portion of the vaginal cuff, consistent with malignancy. Nonspecific findings in the skeleton; there is an area of non--periarticular uptake within L1 where malignancy is not excluded. Remaining findings are indeterminate but are favored to be degenerative. Micronodularity along the LEFT oblique fissure, likely inflammatory.\par Addendum: \par Patient was seen by the referring service on 8/30/2022. Patient was confirmed on physical examination to have a residual cervical stump after supracervical hysterectomy, clinically consistent with an endocervical tumor (?barrel cervix?) on exam, as well as being biopsy-proven. The hypermetabolic soft tissue mass described in the original report is therefore not arising from the vaginal cuff but in fact from the cervical stump. No other changes to original report. \par Consult slides from 9/1/22 showed a pathology of cervical epithelial cell abnormality. Atypical glandular cells present, favor neoplastic. On 9/12/22 an MRI pelvis showed in a patient with positive biopsy of the cervical stump there appears be prominent cervix with cystic change without measurable mass. There is no evidence of extension into surrounding parametrium. No adenopathy can be seen. [de-identified] : PMH: Bell's Palsy 2018 (resolved but has hyperacute hearing bilaterally); selective claustrophobia and phobia of asphyxiation which affects her ability to wear a standard mask. \par PSH: tonsillectomy (age 19);  (); tubal ligation with TOP (); supracervical hysterectomy BSO for fibroids (); varicose vein ligation (); blepharoplasty (); right breast biopsy / FNA - benign (cannot recall year). \par Family Hx: Mother breast ca, 64 yo; Father abdominal ca, 66 yo; Brother pancreatic ca, 67; maternal aunt breast ca; maternal cousin breast ca  26 yo \par Social Hx: smoker for 15 years, quit at 32 yo; ETOH wine 2-3 oz once a day; retired, retail and real estate; , lives with her ; family close by  [de-identified] : Today she is C6D2 of Cisplatin with concurrent RT. She c/o constipation after first cycle, took miralax and resolved. He reported mild fatigue, was able to walk and perform household chores. Ototoxicity + ringing in ears / mild tinnitus (elected not to have baseline hearing test). She denies N/V; Diarrhea; mouth sores; nail / skin changes; hair loss; \par neuropathy and change in taste.

## 2022-11-18 NOTE — PHYSICAL EXAM
[Restricted in physically strenuous activity but ambulatory and able to carry out work of a light or sedentary nature] : Status 1- Restricted in physically strenuous activity but ambulatory and able to carry out work of a light or sedentary nature, e.g., light house work, office work [Normal] : grossly intact [de-identified] : wt stable  [de-identified] : fear of claustrophobia, asphyxiation

## 2022-11-18 NOTE — ASSESSMENT
[FreeTextEntry1] : BRIEF OP NOTE\par DATE/TIME: 11/18/2022 8:50 AM \par PRE-OP DIAGNOSIS: Cervical Cancer\par POST-OP DIAGNOSIS: Cervical cancer\par INDICATION: Cervical cancer, brachytherapy\par PROCEDURE: Examination under anesthesia, insertion of brachytherapy applicator\par PHYSICIAN: OLVIN GRIFFITH \par ASSISTANT: None \par ANESTHESIA: General/Sedation\par CONTRAST: None\par ESTIMATED BLOOD LOSS: Minimal\par FINDINGS/FOLLOW UP PLAN OF CARE: Sandro sleeve in place.  Stable.  Post-procedure recovery, then to Radiation Medicine for brachytherapy planning and treatment.\par SPECIMENS REMOVED: None \par IMPLANTS: \par brachytherapy applicator\par Multichannel vaginal cylinder with interstitial needles in central, 3, 4, 5, 8, 9\par \par COMPLICATIONS: None\par CONDITION/DISPOSITION: Stable.  To Radiation Medicine.  Dispo home.\par

## 2022-11-18 NOTE — HISTORY OF PRESENT ILLNESS
[FreeTextEntry1] : PRE CALL PRIOR TO APPOINTMENT: \par \par Phone Number:[phone]\par \par Diagnosis: Cervical cancer\par \par COVID-19 SWAB: advised\par \par RX on file: yes\par \par Referring MD: Valdo Colmenares\par \par Appointment date: 11/16/2022\par \par Currently on Chemotherapy: \par \par CBC within 48 hours: WBC: ANC: \par \par Diabetic: no\par \par Clotting or Bleeding disorders: no\par \par PPM / Defibrillator: no\par \par NPO status advised: yes\par \par History of fall: no\par \par Assistant device for walking: no\par \par  home: spouse / Gregorio\par \par Blood Thinners: no\par \par Prescribing MD agree to have medication held: n/a\par \par Capacity to make decisions: yes\par \par HCP: yes\par \par DNR:no \par \par Person contact for Pre-Call: Spoke with pt on 11/11/22 5458\par \par \par \par \par \par Guidelines for Screening Anesthesia / Sedation Cases	(TYPE YES OR NO) \par  \par Obtain Prescription / Authorization from Referring Physician: YES	\par \par Medical Necessity (Justification of the need for Anesthesia / Sedation) from referring Physician: YES	\par \par Have you taken oral sedation? (e.g. Xanax, Valium, and other oral sedatives): NO	\par \par Clearance to receive Anesthesia / Sedation: YES- BY Dr. Linton 	\par \par \par \par ANESTHESIA EXCLUSION CRITERIA QUESTIONNAIRE:	\par 	\par Difficult Airway: (TYPE YES OR NO) 	\par  \par Previous Problem with Anesthesia or sedation: NO	\par \par History of Difficult Intubation: NO	\par \par Stridor, Snoring, Sleep Apnea: NO	\par \par Tonsils / Adenoids present: no	\par \par Dysmorphic Facial Features: NO	\par \par Cervical Spine Fusion/ Cervical Spine Surgery: NO	\par \par Tumor in Airway: NO	\par \par Trauma to Airway: NO	\par \par Radiation therapy to head / neck: NO	\par \par Advanced Rheumatoid ArthritiS: NO	\par \par Active Cardiac Ischemia (as defined by EKG or Laboratory Examination): NO	\par \par  Severe COPD / Home O2: NO	\par \par  Known or Suspected Increased Intracranial pressure: NO	\par \par Patient with BMI of 40 or greater : NO Weight (kgs.) 63.1_____ Height (ft.) 5'4''______ BMI__23.8_____	 	\par  	\par Patients with Increased Risk of Aspiration: (TYPE YES OR NO) 		\par \par Abnormal Airway: NO	\par \par Hiatal Hernia with Reflux: NO	\par  \par Pregnancy: NO	 	\par  \par Altered Mental State: NO 	 	\par  \par Spinal Cord Injury with Paraplegia or Quadriplegia: NO	 	\par  \par History of delayed Gastric Emptying: NO 	 	\par  \par  ASA Physical Status of 3 or greater (See Appendix 1 from policy ANSL.5502) 	 	\par  	\par \par Malignant Hyperthermia Screening: (TYPE YES OR NO) 	\par  \par Family history unexpected death following anesthesia or exercise: NO	\par  \par  Family or personal history of Malignant Hyperthermia: NO 	\par  \par  A muscle or neuromuscular disorder: NO \par \par Malignant Hyperthermia Screening\par \par Does the patient have a history of any of the following (Type YES or NO)\par \par Malignant hyperthermia: NO\par \par A muscle or neuromuscular disorder: NO \par \par \par Personal history of: (Type YES or NO)\par \par Muscle spasm: NO\par \par Dark or chocolate-colored urine: NO\par \par Unanticipated fever immediately following anesthesia or exercise: NO	\par \par \par \par Valeri -Operative Assessment (Day of Procedure) \par \par Procedure: T&R\par \par Indication: Cervical cancer\par \par NPO status: \par \par Accompanied by: Gregorio Duke  \par \par Falls risk: no\par \par IVL: \par \par IR MD: Dr. Valdo Colmenares\par \par Urine Pregnancy: n/a\par \par Pre-Op instructions: yes\par \par POST-OP teaching initiated: yes\par \par Allergy bracelet on: yes adhesive tape\par \par Anesthesia plan discussed with IR MD: yes\par \par Antibiotic given: n/a\par

## 2022-11-23 ENCOUNTER — APPOINTMENT (OUTPATIENT)
Dept: INTERVENTIONAL RADIOLOGY/VASCULAR | Facility: CLINIC | Age: 77
End: 2022-11-23

## 2022-11-25 ENCOUNTER — APPOINTMENT (OUTPATIENT)
Dept: HEMATOLOGY ONCOLOGY | Facility: CLINIC | Age: 77
End: 2022-11-25

## 2022-11-25 ENCOUNTER — LABORATORY RESULT (OUTPATIENT)
Age: 77
End: 2022-11-25

## 2022-11-29 ENCOUNTER — APPOINTMENT (OUTPATIENT)
Dept: GYNECOLOGIC ONCOLOGY | Facility: CLINIC | Age: 77
End: 2022-11-29

## 2022-11-29 ENCOUNTER — NON-APPOINTMENT (OUTPATIENT)
Age: 77
End: 2022-11-29

## 2022-11-30 ENCOUNTER — NON-APPOINTMENT (OUTPATIENT)
Age: 77
End: 2022-11-30

## 2022-12-02 ENCOUNTER — NON-APPOINTMENT (OUTPATIENT)
Age: 77
End: 2022-12-02

## 2022-12-02 VITALS
HEART RATE: 96 BPM | SYSTOLIC BLOOD PRESSURE: 146 MMHG | TEMPERATURE: 98 F | RESPIRATION RATE: 16 BRPM | DIASTOLIC BLOOD PRESSURE: 89 MMHG | OXYGEN SATURATION: 98 %

## 2022-12-02 PROCEDURE — 57155 INSERT UTERI TANDEM/OVOIDS: CPT

## 2022-12-02 PROCEDURE — 77771 HDR RDNCL NTRSTL/ICAV BRCHTX: CPT | Mod: 26

## 2022-12-02 PROCEDURE — 77295 3-D RADIOTHERAPY PLAN: CPT | Mod: 26

## 2022-12-02 NOTE — HISTORY OF PRESENT ILLNESS
[FreeTextEntry1] : PRE CALL PRIOR TO APPOINTMENT: \par \par Phone Number:[phone]\par \par Diagnosis: Cervical cancer\par \par COVID-19 SWAB: advised\par \par RX on file: yes\par \par Referring MD: Valdo Colmenares\par \par Appointment date: 11/16/2022\par \par Currently on Chemotherapy: \par \par CBC within 48 hours: WBC: ANC: \par \par Diabetic: no\par \par Clotting or Bleeding disorders: no\par \par PPM / Defibrillator: no\par \par NPO status advised: yes\par \par History of fall: no\par \par Assistant device for walking: no\par \par  home: spouse / Gregorio\par \par Blood Thinners: no\par \par Prescribing MD agree to have medication held: n/a\par \par Capacity to make decisions: yes\par \par HCP: yes\par \par DNR:no \par \par Person contact for Pre-Call: Spoke with pt on 11/11/22 0978\par \par \par \par \par \par Guidelines for Screening Anesthesia / Sedation Cases	(TYPE YES OR NO) \par  \par Obtain Prescription / Authorization from Referring Physician: YES	\par \par Medical Necessity (Justification of the need for Anesthesia / Sedation) from referring Physician: YES	\par \par Have you taken oral sedation? (e.g. Xanax, Valium, and other oral sedatives): NO	\par \par Clearance to receive Anesthesia / Sedation: YES- BY Dr. Linton 	\par \par \par \par ANESTHESIA EXCLUSION CRITERIA QUESTIONNAIRE:	\par 	\par Difficult Airway: (TYPE YES OR NO) 	\par  \par Previous Problem with Anesthesia or sedation: NO	\par \par History of Difficult Intubation: NO	\par \par Stridor, Snoring, Sleep Apnea: NO	\par \par Tonsils / Adenoids present: no	\par \par Dysmorphic Facial Features: NO	\par \par Cervical Spine Fusion/ Cervical Spine Surgery: NO	\par \par Tumor in Airway: NO	\par \par Trauma to Airway: NO	\par \par Radiation therapy to head / neck: NO	\par \par Advanced Rheumatoid ArthritiS: NO	\par \par Active Cardiac Ischemia (as defined by EKG or Laboratory Examination): NO	\par \par  Severe COPD / Home O2: NO	\par \par  Known or Suspected Increased Intracranial pressure: NO	\par \par Patient with BMI of 40 or greater : NO Weight (kgs.) 63.1_____ Height (ft.) 5'4''______ BMI__23.8_____	 	\par  	\par Patients with Increased Risk of Aspiration: (TYPE YES OR NO) 		\par \par Abnormal Airway: NO	\par \par Hiatal Hernia with Reflux: NO	\par  \par Pregnancy: NO	 	\par  \par Altered Mental State: NO 	 	\par  \par Spinal Cord Injury with Paraplegia or Quadriplegia: NO	 	\par  \par History of delayed Gastric Emptying: NO 	 	\par  \par  ASA Physical Status of 3 or greater (See Appendix 1 from policy ANSL.5502) 	 	\par  	\par \par Malignant Hyperthermia Screening: (TYPE YES OR NO) 	\par  \par Family history unexpected death following anesthesia or exercise: NO	\par  \par  Family or personal history of Malignant Hyperthermia: NO 	\par  \par  A muscle or neuromuscular disorder: NO \par \par Malignant Hyperthermia Screening\par \par Does the patient have a history of any of the following (Type YES or NO)\par \par Malignant hyperthermia: NO\par \par A muscle or neuromuscular disorder: NO \par \par \par Personal history of: (Type YES or NO)\par \par Muscle spasm: NO\par \par Dark or chocolate-colored urine: NO\par \par Unanticipated fever immediately following anesthesia or exercise: NO	\par \par \par \par Valeri -Operative Assessment (Day of Procedure) \par \par Procedure: T&R\par \par Indication: Cervical cancer\par \par NPO status: last meal 12/1/22\par \par Accompanied by: Gregorio Duke  \par \par Falls risk: no\par \par IVL: 20g LAC\par \par IR MD: Dr. Valdo Colmenares\par \par Urine Pregnancy: n/a\par \par Pre-Op instructions: yes\par \par POST-OP teaching initiated: yes\par \par Allergy bracelet on: yes adhesive tape\par \par Anesthesia plan discussed with IR MD: yes\par \par Antibiotic given: n/a\par \par

## 2022-12-02 NOTE — ASSESSMENT
[FreeTextEntry1] : BRIEF OP NOTE\par DATE/TIME: 12/02/2022 8:43 AM \par PRE-OP DIAGNOSIS: Cervical Cancer\par POST-OP DIAGNOSIS: Cervical cancer\par INDICATION: Cervical cancer, brachytherapy\par PROCEDURE: Examination under anesthesia, insertion of brachytherapy applicator\par PHYSICIAN: OLVIN GRIFFITH \par ASSISTANT: None \par ANESTHESIA: General/Sedation\par CONTRAST: None\par ESTIMATED BLOOD LOSS: Minimal\par FINDINGS/FOLLOW UP PLAN OF CARE: Sandro sleeve in place.  Stable.  Post-procedure recovery, then to Radiation Medicine for brachytherapy planning and treatment.\par SPECIMENS REMOVED: None \par IMPLANTS: \par Interstitial brachytherapy applicator\par 3 cm intrauterine tandem, straight\par 4 interstitial needles at positions 3, 4, 7, 8; 3 cm depth\par \par COMPLICATIONS: None\par CONDITION/DISPOSITION: Stable.  To Radiation Medicine.  Dispo home.\par

## 2022-12-05 NOTE — HISTORY OF PRESENT ILLNESS
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 Schmidt.\par \par ECC and biopsy 9/15/2021  showed chronic and acute inflammation, reactive atypia, no evidence of dysplasia.\par \par She had an NITIN pap 8/3/21 and follow up colposcopy with biopsies  was negative for dysplasia or cancer.  \par  Pap on 8/8/22 again noted NITIN, favoring neoplasia. HPV negative.\par  8/8/22 Colposcopy with biopsies demonstrated well differentiated endocervical adenocarcinoma on the ECC.  \par \par She denies bleeding or pain and has always worn a tampon and continues to wear one for "cleanliness reasons."   She also takes Premarin PO for hot flashes.\par \par 8/8/22 Colposcopy ([facility]) - task sent for [facility] path review 8/30/22 \par Cervix:  12 o'clock:  atypical endocervical epithelium, squamous mucosa with reactive atypia.  \par ECC:  endocervical adenocarcinoma, well differentiated.  Tumor cells positive for P16 and Ki-67 was increased.  ER and [place] were negative.  \par \par 8/24/22 PET CT \par 1.  Hypermetabolic soft tissue mass arising from the LEFT portion of the vaginal cuff, consistent with malignancy.\par 2.  Nonspecific findings in the skeleton; there is an area of non--periarticular uptake within L1 where malignancy is not excluded. Remaining findings are indeterminate but are favored to be degenerative. MRI may be helpful.\par 3.  Micronodularity along the LEFT oblique fissure, likely inflammatory.\par \par MRI pelvis 9/12/22 In a patient with positive biopsy of the cervical stump there appears be prominent cervix with cystic change without measurable mass. There is no evidence of extension into surrounding parametrium. No adenopathy can be seen.\par \par She completed a dose of 5000 to the pelvis/cervix on 11/15/22. Recieved Brachytherapy with Dr. Colmenares on 11/16/22, 11/18/22, 11/30/22, and 12/2/22. \par \par She presents for a one month PTE. No new GI symptoms. Otherwise well. Reports mild pelvic discomfort and fatigue.

## 2022-12-07 ENCOUNTER — APPOINTMENT (OUTPATIENT)
Dept: INTERVENTIONAL RADIOLOGY/VASCULAR | Facility: CLINIC | Age: 77
End: 2022-12-07

## 2022-12-07 ENCOUNTER — NON-APPOINTMENT (OUTPATIENT)
Age: 77
End: 2022-12-07

## 2022-12-07 VITALS
HEART RATE: 83 BPM | TEMPERATURE: 98 F | DIASTOLIC BLOOD PRESSURE: 83 MMHG | SYSTOLIC BLOOD PRESSURE: 146 MMHG | OXYGEN SATURATION: 99 % | RESPIRATION RATE: 16 BRPM

## 2022-12-07 PROCEDURE — 57155 INSERT UTERI TANDEM/OVOIDS: CPT

## 2022-12-07 PROCEDURE — 77771 HDR RDNCL NTRSTL/ICAV BRCHTX: CPT | Mod: 26

## 2022-12-07 PROCEDURE — 77295 3-D RADIOTHERAPY PLAN: CPT | Mod: 26

## 2022-12-07 NOTE — ASSESSMENT
[FreeTextEntry1] : BRIEF OP NOTE\par DATE/TIME: 12/07/2022 8:37 AM \par PRE-OP DIAGNOSIS: Cervical Cancer\par POST-OP DIAGNOSIS: Cervical cancer\par INDICATION: Cervical cancer, brachytherapy\par PROCEDURE: Examination under anesthesia, insertion of brachytherapy applicator\par PHYSICIAN: OLVIN GRIFFITH \par ASSISTANT: None \par ANESTHESIA: General/Sedation\par CONTRAST: None\par ESTIMATED BLOOD LOSS: Minimal\par FINDINGS/FOLLOW UP PLAN OF CARE: Sandro sleeve visualized, and removed.  Stable.  Post-procedure recovery, then to Radiation Medicine for brachytherapy planning and treatment.\par SPECIMENS REMOVED: Sandro sleeve removed.\par IMPLANTS: \par Hybrid interstitial applicator\par [x] cervical tandem \par      Angle:\par           [x] 0'\par      Length:\par           [x] 30 mm\par [x] interstitial needles, positions 3, 4, 7, 8; depth 3 cm\par \par COMPLICATIONS: None\par CONDITION/DISPOSITION: Stable.  To Radiation Medicine.  Dispo home.\par

## 2022-12-07 NOTE — HISTORY OF PRESENT ILLNESS
[FreeTextEntry1] : Reason For Visit\par MALISSA MURCIA is being seen for a T&R procedure visit, dx:Cervical cancer. \par  \par History of Present Illness\par PRE CALL PRIOR TO APPOINTMENT: \par \par Phone Number:[phone]\par \par Diagnosis: Cervical cancer\par \par COVID-19 SWAB: advised\par \par RX on file: yes\par \par Referring MD: Valdo Colmenares\par \par Appointment date: 12/7/22\par  \par Diabetic: no\par \par Clotting or Bleeding disorders: no\par \par PPM / Defibrillator: no\par \par NPO status advised: yes\par \par History of fall: no\par \par Assistant device for walking: no\par \par  home: spouse / Gregorio\par \par Blood Thinners: no\par \par Prescribing MD agree to have medication held: n/a\par \par Capacity to make decisions: yes\par \par HCP: yes\par \par DNR:no \par \par Person contact for Pre-Call: Spoke with pt on 11/11/22 4812\par \par \par \par \par \par Guidelines for Screening Anesthesia / Sedation Cases	(TYPE YES OR NO) \par  \par Obtain Prescription / Authorization from Referring Physician: YES	\par \par Medical Necessity (Justification of the need for Anesthesia / Sedation) from referring Physician: YES	\par \par Have you taken oral sedation? (e.g. Xanax, Valium, and other oral sedatives): NO	\par \par Clearance to receive Anesthesia / Sedation: YES- BY Dr. Linton 	\par \par \par \par ANESTHESIA EXCLUSION CRITERIA QUESTIONNAIRE:	\par 	\par Difficult Airway: (TYPE YES OR NO) 	\par  \par Previous Problem with Anesthesia or sedation: NO	\par \par History of Difficult Intubation: NO	\par \par Stridor, Snoring, Sleep Apnea: NO	\par \par Tonsils / Adenoids present: no	\par \par Dysmorphic Facial Features: NO	\par \par Cervical Spine Fusion/ Cervical Spine Surgery: NO	\par \par Tumor in Airway: NO	\par \par Trauma to Airway: NO	\par \par Radiation therapy to head / neck: NO	\par \par Advanced Rheumatoid ArthritiS: NO	\par \par Active Cardiac Ischemia (as defined by EKG or Laboratory Examination): NO	\par \par  Severe COPD / Home O2: NO	\par \par  Known or Suspected Increased Intracranial pressure: NO	\par \par Patient with BMI of 40 or greater : NO Weight (kgs.) 63.1_____ Height (ft.) 5'4''______ BMI__23.8_____	 	\par  	\par Patients with Increased Risk of Aspiration: (TYPE YES OR NO) 		\par \par Abnormal Airway: NO	\par \par Hiatal Hernia with Reflux: NO	\par  \par Pregnancy: NO	 	\par  \par Altered Mental State: NO 	 	\par  \par Spinal Cord Injury with Paraplegia or Quadriplegia: NO	 	\par  \par History of delayed Gastric Emptying: NO 	 	\par  \par  ASA Physical Status of 3 or greater (See Appendix 1 from policy [id number].5502) 	 	\par  	\par \par Malignant Hyperthermia Screening: (TYPE YES OR NO) 	\par  \par Family history unexpected death following anesthesia or exercise: NO	\par  \par  Family or personal history of Malignant Hyperthermia: NO 	\par  \par  A muscle or neuromuscular disorder: NO \par \par Malignant Hyperthermia Screening\par \par Does the patient have a history of any of the following (Type YES or NO)\par \par Malignant hyperthermia: NO\par \par A muscle or neuromuscular disorder: NO \par \par \par Personal history of: (Type YES or NO)\par \par Muscle spasm: NO\par \par Dark or chocolate-colored urine: NO\par \par Unanticipated fever immediately following anesthesia or exercise: NO	\par \par \par \par Valeri -Operative Assessment (Day of Procedure) \par \par Procedure: T&R\par \par Indication: Cervical cancer\par \par NPO status: last meal 12/6/22\par \par Accompanied by: Gregorio Duke  \par \par Falls risk: no\par \par IVL: 20g LAC\par \par IR MD: Dr. Valdo Colmenares\par \par Urine Pregnancy: n/a\par \par Pre-Op instructions: yes\par \par POST-OP teaching initiated: yes\par \par Allergy bracelet on: yes adhesive tape\par \par Anesthesia plan discussed with IR MD: yes\par \par Antibiotic given: n/a\par

## 2022-12-12 ENCOUNTER — APPOINTMENT (OUTPATIENT)
Dept: RADIATION ONCOLOGY | Facility: CLINIC | Age: 77
End: 2022-12-12

## 2022-12-12 ENCOUNTER — NON-APPOINTMENT (OUTPATIENT)
Age: 77
End: 2022-12-12

## 2023-01-02 ENCOUNTER — FORM ENCOUNTER (OUTPATIENT)
Age: 78
End: 2023-01-02

## 2023-01-03 ENCOUNTER — OFFICE (OUTPATIENT)
Dept: URBAN - METROPOLITAN AREA CLINIC 102 | Facility: CLINIC | Age: 78
Setting detail: OPHTHALMOLOGY
End: 2023-01-03
Payer: MEDICARE

## 2023-01-03 DIAGNOSIS — G51.0: ICD-10-CM

## 2023-01-03 DIAGNOSIS — H53.2: ICD-10-CM

## 2023-01-03 DIAGNOSIS — H16.223: ICD-10-CM

## 2023-01-03 DIAGNOSIS — H25.13: ICD-10-CM

## 2023-01-03 DIAGNOSIS — H40.033: ICD-10-CM

## 2023-01-03 PROCEDURE — 92014 COMPRE OPH EXAM EST PT 1/>: CPT | Performed by: OPHTHALMOLOGY

## 2023-01-03 PROCEDURE — 92020 GONIOSCOPY: CPT | Performed by: OPHTHALMOLOGY

## 2023-01-03 ASSESSMENT — REFRACTION_CURRENTRX
OS_OVR_VA: 20/
OD_VPRISM_DIRECTION: SV
OS_AXIS: 95
OD_SPHERE: +2.25
OS_SPHERE: +2.25
OD_AXIS: 104
OD_CYLINDER: -3.00
OS_CYLINDER: -4.00
OD_OVR_VA: 20/
OS_VPRISM_DIRECTION: SV

## 2023-01-03 ASSESSMENT — REFRACTION_AUTOREFRACTION
OS_AXIS: 096
OS_CYLINDER: -3.00
OD_CYLINDER: -3.50
OS_SPHERE: +2.50
OD_AXIS: 105
OD_SPHERE: +3.50

## 2023-01-03 ASSESSMENT — TONOMETRY
OS_IOP_MMHG: 15
OD_IOP_MMHG: 14

## 2023-01-03 ASSESSMENT — KERATOMETRY
OD_AXISANGLE_DEGREES: 016
OD_K1POWER_DIOPTERS: 45.00
OD_K2POWER_DIOPTERS: 47.00
OS_AXISANGLE_DEGREES: 177
METHOD_AUTO_MANUAL: AUTO
OS_K2POWER_DIOPTERS: 47.25
OS_K1POWER_DIOPTERS: 45.25

## 2023-01-03 ASSESSMENT — SPHEQUIV_DERIVED
OD_SPHEQUIV: 1.75
OS_SPHEQUIV: 1

## 2023-01-03 ASSESSMENT — AXIALLENGTH_DERIVED
OS_AL: 22.2706
OD_AL: 22.0935

## 2023-01-03 ASSESSMENT — VISUAL ACUITY
OD_BCVA: 20/20-
OS_BCVA: 20/20-

## 2023-01-03 ASSESSMENT — CONFRONTATIONAL VISUAL FIELD TEST (CVF)
OD_FINDINGS: FULL
OS_FINDINGS: FULL

## 2023-01-09 ENCOUNTER — APPOINTMENT (OUTPATIENT)
Dept: RADIATION ONCOLOGY | Facility: CLINIC | Age: 78
End: 2023-01-09
Payer: MEDICARE

## 2023-01-09 VITALS
BODY MASS INDEX: 23.56 KG/M2 | SYSTOLIC BLOOD PRESSURE: 140 MMHG | HEIGHT: 64 IN | DIASTOLIC BLOOD PRESSURE: 80 MMHG | WEIGHT: 138 LBS | HEART RATE: 78 BPM | RESPIRATION RATE: 18 BRPM

## 2023-01-09 PROCEDURE — 99212 OFFICE O/P EST SF 10 MIN: CPT

## 2023-01-09 NOTE — VITALS
[Maximal Pain Intensity: 0/10] : 0/10 [NoTreatment Scheduled] : no treatment scheduled [90: Able to carry normal activity; minor signs or symptoms of disease.] : 90: Able to carry normal activity; minor signs or symptoms of disease.

## 2023-01-09 NOTE — HISTORY OF PRESENT ILLNESS
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 Schmidt.\par \par ECC and biopsy 9/15/2021  showed chronic and acute inflammation, reactive atypia, no evidence of dysplasia.\par \par She had an NITIN pap 8/3/21 and follow up colposcopy with biopsies  was negative for dysplasia or cancer.  \par  Pap on 8/8/22 again noted NITIN, favoring neoplasia. HPV negative.\par  8/8/22 Colposcopy with biopsies demonstrated well differentiated endocervical adenocarcinoma on the ECC.  \par \par She denies bleeding or pain and has always worn a tampon and continues to wear one for "cleanliness reasons."   She also takes Premarin PO for hot flashes.\par \par 8/8/22 Colposcopy ([facility]) - task sent for [facility] path review 8/30/22 \par Cervix:  12 o'clock:  atypical endocervical epithelium, squamous mucosa with reactive atypia.  \par ECC:  endocervical adenocarcinoma, well differentiated.  Tumor cells positive for P16 and Ki-67 was increased.  ER and [place] were negative.  \par \par 8/24/22 PET CT \par 1.  Hypermetabolic soft tissue mass arising from the LEFT portion of the vaginal cuff, consistent with malignancy.\par 2.  Nonspecific findings in the skeleton; there is an area of non--periarticular uptake within L1 where malignancy is not excluded. Remaining findings are indeterminate but are favored to be degenerative. MRI may be helpful.\par 3.  Micronodularity along the LEFT oblique fissure, likely inflammatory.\par \par MRI pelvis 9/12/22 In a patient with positive biopsy of the cervical stump there appears be prominent cervix with cystic change without measurable mass. There is no evidence of extension into surrounding parametrium. No adenopathy can be seen.\par She completed radiation therapy on 11/15/22 with cGy 5000 in 25 fractions to pelvis/cervix.\par \par She underwent insertion of brachytherapy applicator and completed 4 fractions of cervical tumor boost with 2800 cGy last treatment on 12/7/22, done by Dedra Hart. She tolerated the treatment well.\par \par She presents for 1 month PTE. Denies nausea, vaginal bleeding, pain, her fatigue is improving, she is due for shingles vaccine now. Has appointment to f/u with Dr. Niño is in 2 weeks. She is still on Premarin p.o and would like to go back to using her vaginal tampon daily which was he habit prior to treatment. She states her baseline is excessive vaginal discharge.

## 2023-01-15 ENCOUNTER — OUTPATIENT (OUTPATIENT)
Dept: OUTPATIENT SERVICES | Facility: HOSPITAL | Age: 78
LOS: 1 days | Discharge: HOME | End: 2023-01-15

## 2023-01-15 DIAGNOSIS — Z98.891 HISTORY OF UTERINE SCAR FROM PREVIOUS SURGERY: Chronic | ICD-10-CM

## 2023-01-15 DIAGNOSIS — Z98.890 OTHER SPECIFIED POSTPROCEDURAL STATES: Chronic | ICD-10-CM

## 2023-01-15 DIAGNOSIS — Z90.710 ACQUIRED ABSENCE OF BOTH CERVIX AND UTERUS: Chronic | ICD-10-CM

## 2023-01-15 DIAGNOSIS — C53.9 MALIGNANT NEOPLASM OF CERVIX UTERI, UNSPECIFIED: ICD-10-CM

## 2023-01-15 DIAGNOSIS — Z98.51 TUBAL LIGATION STATUS: Chronic | ICD-10-CM

## 2023-01-23 ENCOUNTER — RESULT REVIEW (OUTPATIENT)
Age: 78
End: 2023-01-23

## 2023-01-23 ENCOUNTER — APPOINTMENT (OUTPATIENT)
Dept: HEMATOLOGY ONCOLOGY | Facility: CLINIC | Age: 78
End: 2023-01-23
Payer: MEDICARE

## 2023-01-23 VITALS
WEIGHT: 141.31 LBS | HEIGHT: 64 IN | SYSTOLIC BLOOD PRESSURE: 131 MMHG | OXYGEN SATURATION: 97 % | RESPIRATION RATE: 18 BRPM | HEART RATE: 81 BPM | TEMPERATURE: 98.3 F | DIASTOLIC BLOOD PRESSURE: 81 MMHG | BODY MASS INDEX: 24.13 KG/M2

## 2023-01-23 LAB
ALBUMIN SERPL ELPH-MCNC: 4.2 G/DL — SIGNIFICANT CHANGE UP (ref 3.3–5)
ALP SERPL-CCNC: 71 U/L — SIGNIFICANT CHANGE UP (ref 40–120)
ALT FLD-CCNC: 11 U/L — SIGNIFICANT CHANGE UP (ref 10–45)
ANION GAP SERPL CALC-SCNC: 15 MMOL/L — SIGNIFICANT CHANGE UP (ref 5–17)
AST SERPL-CCNC: 16 U/L — SIGNIFICANT CHANGE UP (ref 10–40)
BASOPHILS # BLD AUTO: 0.02 K/UL — SIGNIFICANT CHANGE UP (ref 0–0.2)
BASOPHILS NFR BLD AUTO: 0.6 % — SIGNIFICANT CHANGE UP (ref 0–2)
BILIRUB SERPL-MCNC: 0.3 MG/DL — SIGNIFICANT CHANGE UP (ref 0.2–1.2)
BUN SERPL-MCNC: 23 MG/DL — SIGNIFICANT CHANGE UP (ref 7–23)
CALCIUM SERPL-MCNC: 9.5 MG/DL — SIGNIFICANT CHANGE UP (ref 8.4–10.5)
CHLORIDE SERPL-SCNC: 107 MMOL/L — SIGNIFICANT CHANGE UP (ref 96–108)
CO2 SERPL-SCNC: 21 MMOL/L — LOW (ref 22–31)
CREAT SERPL-MCNC: 0.91 MG/DL — SIGNIFICANT CHANGE UP (ref 0.5–1.3)
EGFR: 65 ML/MIN/1.73M2 — SIGNIFICANT CHANGE UP
EOSINOPHIL # BLD AUTO: 0.13 K/UL — SIGNIFICANT CHANGE UP (ref 0–0.5)
EOSINOPHIL NFR BLD AUTO: 3.7 % — SIGNIFICANT CHANGE UP (ref 0–6)
GLUCOSE SERPL-MCNC: 93 MG/DL — SIGNIFICANT CHANGE UP (ref 70–99)
HCT VFR BLD CALC: 33 % — LOW (ref 34.5–45)
HGB BLD-MCNC: 11 G/DL — LOW (ref 11.5–15.5)
IMM GRANULOCYTES NFR BLD AUTO: 0.6 % — SIGNIFICANT CHANGE UP (ref 0–0.9)
LYMPHOCYTES # BLD AUTO: 0.54 K/UL — LOW (ref 1–3.3)
LYMPHOCYTES # BLD AUTO: 15.3 % — SIGNIFICANT CHANGE UP (ref 13–44)
MAGNESIUM SERPL-MCNC: 1.8 MG/DL — SIGNIFICANT CHANGE UP (ref 1.6–2.6)
MCHC RBC-ENTMCNC: 30.9 PG — SIGNIFICANT CHANGE UP (ref 27–34)
MCHC RBC-ENTMCNC: 33.3 GM/DL — SIGNIFICANT CHANGE UP (ref 32–36)
MCV RBC AUTO: 92.7 FL — SIGNIFICANT CHANGE UP (ref 80–100)
MONOCYTES # BLD AUTO: 0.4 K/UL — SIGNIFICANT CHANGE UP (ref 0–0.9)
MONOCYTES NFR BLD AUTO: 11.3 % — SIGNIFICANT CHANGE UP (ref 2–14)
NEUTROPHILS # BLD AUTO: 2.43 K/UL — SIGNIFICANT CHANGE UP (ref 1.8–7.4)
NEUTROPHILS NFR BLD AUTO: 68.5 % — SIGNIFICANT CHANGE UP (ref 43–77)
NRBC # BLD: 0 /100 WBCS — SIGNIFICANT CHANGE UP (ref 0–0)
PLATELET # BLD AUTO: 190 K/UL — SIGNIFICANT CHANGE UP (ref 150–400)
POTASSIUM SERPL-MCNC: 4.1 MMOL/L — SIGNIFICANT CHANGE UP (ref 3.5–5.3)
POTASSIUM SERPL-SCNC: 4.1 MMOL/L — SIGNIFICANT CHANGE UP (ref 3.5–5.3)
PROT SERPL-MCNC: 6.3 G/DL — SIGNIFICANT CHANGE UP (ref 6–8.3)
RBC # BLD: 3.56 M/UL — LOW (ref 3.8–5.2)
RBC # FLD: 13.6 % — SIGNIFICANT CHANGE UP (ref 10.3–14.5)
SODIUM SERPL-SCNC: 143 MMOL/L — SIGNIFICANT CHANGE UP (ref 135–145)
WBC # BLD: 3.54 K/UL — LOW (ref 3.8–10.5)
WBC # FLD AUTO: 3.54 K/UL — LOW (ref 3.8–10.5)

## 2023-01-23 PROCEDURE — 99214 OFFICE O/P EST MOD 30 MIN: CPT

## 2023-02-01 NOTE — RESULTS/DATA
[FreeTextEntry1] : 9/12/22 MRI Pelvis: In a patient with positive biopsy of the cervical stump there appears be prominent cervix with cystic change without measurable mass. There is no evidence of extension into surrounding parametrium. No adenopathy can be seen.\par \par 9/1/22 Path: PAP: Cervical epithelial cell abnormality. Atypical glandular cells present, favor neoplastic.\par \par 8/24/22 PET: Hypermetabolic soft tissue mass arising from the LEFT portion of the vaginal cuff, consistent with malignancy. Nonspecific findings in the skeleton; there is an area of non--periarticular uptake within L1 where malignancy is not excluded. Remaining findings are indeterminate but are favored to be degenerative. Micronodularity along the LEFT oblique fissure, likely inflammatory.\par Addendum: \par Patient was seen by the referring service on 8/30/2022. Patient was confirmed on physical examination to have a residual cervical stump after supracervical hysterectomy, clinically consistent with an endocervical tumor (?barrel cervix?) on exam, as well as being biopsy-proven. The hypermetabolic soft tissue mass described in the original report is therefore not arising from the vaginal cuff but in fact from the cervical stump. No other changes to original report.\par \par 8/8/22 Path: Cervical biopsy: Atypical endocervical epithelium. Squamous mucosa with reactive atypia. See part B. \par Part B: Endocervical adenocarcinoma, well-differentiated. Tumor cells positive for P16 and Ki-67 was increased. Er and MI were negative.

## 2023-02-01 NOTE — PHYSICAL EXAM
[Restricted in physically strenuous activity but ambulatory and able to carry out work of a light or sedentary nature] : Status 1- Restricted in physically strenuous activity but ambulatory and able to carry out work of a light or sedentary nature, e.g., light house work, office work [Normal] : grossly intact [de-identified] : wt stable  [de-identified] : fear of claustrophobia, asphyxiation, chronic

## 2023-02-01 NOTE — REVIEW OF SYSTEMS
[Anxiety] : anxiety [Negative] : Allergic/Immunologic [Recent Change In Weight] : ~T recent weight change [Dysphagia] : no dysphagia [Odynophagia] : no odynophagia [Chest Pain] : no chest pain [Shortness Of Breath] : no shortness of breath [Abdominal Pain] : no abdominal pain [FreeTextEntry2] : wt gain 3 lbs  [de-identified] : fear of asphyxiation

## 2023-02-01 NOTE — HISTORY OF PRESENT ILLNESS
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 She had a colposcopy on 8/8/22 and pathology showed atypical endocervical epithelium. Squamous mucosa with reactive atypia. See part B. Part B: Endocervical adenocarcinoma, well-differentiated. Tumor cells positive for P16 and Ki-67 was increased. Er and DE were negative. She had a PET on 8/24/22 which showed hypermetabolic soft tissue mass arising from the LEFT portion of the vaginal cuff, consistent with malignancy. Nonspecific findings in the skeleton; there is an area of non--periarticular uptake within L1 where malignancy is not excluded. Remaining findings are indeterminate but are favored to be degenerative. Micronodularity along the LEFT oblique fissure, likely inflammatory.\par Addendum: \par Patient was seen by the referring service on 8/30/2022. Patient was confirmed on physical examination to have a residual cervical stump after supracervical hysterectomy, clinically consistent with an endocervical tumor (?barrel cervix?) on exam, as well as being biopsy-proven. The hypermetabolic soft tissue mass described in the original report is therefore not arising from the vaginal cuff but in fact from the cervical stump. No other changes to original report. \par Consult slides from 9/1/22 showed a pathology of cervical epithelial cell abnormality. Atypical glandular cells present, favor neoplastic. On 9/12/22 an MRI pelvis showed in a patient with positive biopsy of the cervical stump there appears be prominent cervix with cystic change without measurable mass. There is no evidence of extension into surrounding parametrium. No adenopathy can be seen. [de-identified] : PMH: Bell's Palsy 2018 (resolved but has hyperacute hearing bilaterally); selective claustrophobia and phobia of asphyxiation which affects her ability to wear a standard mask. \par PSH: tonsillectomy (age 19);  (); tubal ligation with TOP (); supracervical hysterectomy BSO for fibroids (); varicose vein ligation (); blepharoplasty (); right breast biopsy / FNA - benign (cannot recall year). \par Family Hx: Mother breast ca, 64 yo; Father abdominal ca, 68 yo; Brother pancreatic ca, 67; maternal aunt breast ca; maternal cousin breast ca  28 yo \par Social Hx: smoker for 15 years, quit at 30 yo; ETOH wine 2-3 oz once a day; retired, retail and real estate; , lives with her ; family close by  [de-identified] : She completed 6 cycles of Cisplatin on 11/2022 concurrent RT, completed 12/7/22. Her energy is resolving, trying to increase her walking. Ototoxicity + ringing in ears / mild tinnitus due to bells palsy (elected not to have baseline hearing test). She denies N/V; diarrhea; constipation; mouth sores; nail / skin changes; hair loss; neuropathy and change in taste. wt gain 3 lbs.

## 2023-02-27 ENCOUNTER — OUTPATIENT (OUTPATIENT)
Dept: OUTPATIENT SERVICES | Facility: HOSPITAL | Age: 78
LOS: 1 days | End: 2023-02-27
Payer: MEDICARE

## 2023-02-27 ENCOUNTER — APPOINTMENT (OUTPATIENT)
Dept: NUCLEAR MEDICINE | Facility: CLINIC | Age: 78
End: 2023-02-27
Payer: MEDICARE

## 2023-02-27 DIAGNOSIS — Z98.890 OTHER SPECIFIED POSTPROCEDURAL STATES: Chronic | ICD-10-CM

## 2023-02-27 DIAGNOSIS — C53.9 MALIGNANT NEOPLASM OF CERVIX UTERI, UNSPECIFIED: ICD-10-CM

## 2023-02-27 DIAGNOSIS — Z98.891 HISTORY OF UTERINE SCAR FROM PREVIOUS SURGERY: Chronic | ICD-10-CM

## 2023-02-27 DIAGNOSIS — Z90.710 ACQUIRED ABSENCE OF BOTH CERVIX AND UTERUS: Chronic | ICD-10-CM

## 2023-02-27 DIAGNOSIS — Z98.51 TUBAL LIGATION STATUS: Chronic | ICD-10-CM

## 2023-02-27 PROCEDURE — A9552: CPT

## 2023-02-27 PROCEDURE — 78815 PET IMAGE W/CT SKULL-THIGH: CPT

## 2023-02-27 PROCEDURE — 78815 PET IMAGE W/CT SKULL-THIGH: CPT | Mod: 26,PS,MH

## 2023-03-06 ENCOUNTER — APPOINTMENT (OUTPATIENT)
Dept: RADIATION ONCOLOGY | Facility: CLINIC | Age: 78
End: 2023-03-06
Payer: MEDICARE

## 2023-03-06 VITALS
SYSTOLIC BLOOD PRESSURE: 120 MMHG | OXYGEN SATURATION: 96 % | HEART RATE: 65 BPM | WEIGHT: 142 LBS | RESPIRATION RATE: 17 BRPM | DIASTOLIC BLOOD PRESSURE: 72 MMHG | BODY MASS INDEX: 24.24 KG/M2 | HEIGHT: 64 IN

## 2023-03-06 PROCEDURE — 99213 OFFICE O/P EST LOW 20 MIN: CPT

## 2023-03-06 NOTE — REVIEW OF SYSTEMS
[Diarrhea: Grade 0] : Diarrhea: Grade 0 [Nausea: Grade 0] : Nausea: Grade 0 [Vomiting: Grade 0] : Vomiting: Grade 0 [Fatigue: Grade 0] : Fatigue: Grade 0 [Hematuria: Grade 0] : Hematuria: Grade 0 [Urinary Incontinence: Grade 0] : Urinary Incontinence: Grade 0  [Urinary Retention: Grade 0] : Urinary Retention: Grade 0 [Urinary Tract Pain: Grade 0] : Urinary Tract Pain: Grade 0 [Urinary Urgency: Grade 0] : Urinary Urgency: Grade 0

## 2023-03-06 NOTE — HISTORY OF PRESENT ILLNESS
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 Schmidt.\par \par ECC and biopsy 9/15/2021  showed chronic and acute inflammation, reactive atypia, no evidence of dysplasia.\par \par She had an NITIN pap 8/3/21 and follow up colposcopy with biopsies  was negative for dysplasia or cancer.  \par  Pap on 8/8/22 again noted NITIN, favoring neoplasia. HPV negative.\par  8/8/22 Colposcopy with biopsies demonstrated well differentiated endocervical adenocarcinoma on the ECC.  \par \par She denies bleeding or pain and has always worn a tampon and continues to wear one for "cleanliness reasons."   She also takes Premarin PO for hot flashes.\par \par 8/8/22 Colposcopy ([facility]) - task sent for [facility] path review 8/30/22 \par Cervix:  12 o'clock:  atypical endocervical epithelium, squamous mucosa with reactive atypia.  \par ECC:  endocervical adenocarcinoma, well differentiated.  Tumor cells positive for P16 and Ki-67 was increased.  ER and [place] were negative.  \par \par 8/24/22 PET CT \par 1.  Hypermetabolic soft tissue mass arising from the LEFT portion of the vaginal cuff, consistent with malignancy.\par 2.  Nonspecific findings in the skeleton; there is an area of non--periarticular uptake within L1 where malignancy is not excluded. Remaining findings are indeterminate but are favored to be degenerative. MRI may be helpful.\par 3.  Micronodularity along the LEFT oblique fissure, likely inflammatory.\par \par MRI pelvis 9/12/22 In a patient with positive biopsy of the cervical stump there appears be prominent cervix with cystic change without measurable mass. There is no evidence of extension into surrounding parametrium. No adenopathy can be seen.\par She completed external beam radiation therapy on 11/15/22 5000 cGy in 25 fractions to pelvis/cervix.\par \par She then received brachytherapy 4 fractions of cervical tumor boost to 2800 cGy last treatment on 12/7/22 with Dr. Colmenares. She tolerated the treatment well.\par \par She presents for 3 month FU. PET/CT on 2/27/23 which indicated previously seen finding at the cervical stump appears somewhat smaller on the low dose CT. Areas of hypermetabolic activity appear smaller as well, although remain quantitatively unchanged. Stable pulmonary findings without abnormal uptake. No new suspicious finding was noted.\par She feels well and denies any vaginal bleeding or urinary complaints.  She reports several small bowel movements per day which are not very bothersome.  She denies pain.  She is back to her normal activities.  She does not have follow-up scheduled with Dr. Schmidt. FU with Dr. Matos next week.\par \par \par She presents for a 4 month follow up. Denies nausea, vaginal bleeding, pain, her fatigue is improving, she is due for shingles vaccine now. She is still on Premarin p.o and would like to go back to using her vaginal tampon daily which was her habit prior to treatment. She states her baseline is mild vaginal discharge. To follow up with Dr. Matos next week.

## 2023-03-06 NOTE — PHYSICAL EXAM
[] : no respiratory distress [Nondistended] : nondistended [Abdomen Tenderness] : non-tender [Femoral Lymph Nodes Enlarged Bilaterally] : femoral [Inguinal Lymph Nodes Enlarged Bilaterally] : inguinal [Normal] : no joint swelling, no clubbing or cyanosis of the fingernails and muscle strength and tone were normal

## 2023-03-13 ENCOUNTER — RESULT REVIEW (OUTPATIENT)
Age: 78
End: 2023-03-13

## 2023-03-13 ENCOUNTER — APPOINTMENT (OUTPATIENT)
Dept: HEMATOLOGY ONCOLOGY | Facility: CLINIC | Age: 78
End: 2023-03-13
Payer: MEDICARE

## 2023-03-13 VITALS
DIASTOLIC BLOOD PRESSURE: 82 MMHG | BODY MASS INDEX: 24.6 KG/M2 | OXYGEN SATURATION: 96 % | TEMPERATURE: 97.8 F | HEART RATE: 82 BPM | RESPIRATION RATE: 18 BRPM | WEIGHT: 143.31 LBS | SYSTOLIC BLOOD PRESSURE: 136 MMHG

## 2023-03-13 LAB
BASOPHILS # BLD AUTO: 0.02 K/UL — SIGNIFICANT CHANGE UP (ref 0–0.2)
BASOPHILS NFR BLD AUTO: 0.6 % — SIGNIFICANT CHANGE UP (ref 0–2)
EOSINOPHIL # BLD AUTO: 0.12 K/UL — SIGNIFICANT CHANGE UP (ref 0–0.5)
EOSINOPHIL NFR BLD AUTO: 3.6 % — SIGNIFICANT CHANGE UP (ref 0–6)
HCT VFR BLD CALC: 36.1 % — SIGNIFICANT CHANGE UP (ref 34.5–45)
HGB BLD-MCNC: 12.1 G/DL — SIGNIFICANT CHANGE UP (ref 11.5–15.5)
IMM GRANULOCYTES NFR BLD AUTO: 0.3 % — SIGNIFICANT CHANGE UP (ref 0–0.9)
LYMPHOCYTES # BLD AUTO: 0.61 K/UL — LOW (ref 1–3.3)
LYMPHOCYTES # BLD AUTO: 18.2 % — SIGNIFICANT CHANGE UP (ref 13–44)
MCHC RBC-ENTMCNC: 30.4 PG — SIGNIFICANT CHANGE UP (ref 27–34)
MCHC RBC-ENTMCNC: 33.5 GM/DL — SIGNIFICANT CHANGE UP (ref 32–36)
MCV RBC AUTO: 90.7 FL — SIGNIFICANT CHANGE UP (ref 80–100)
MONOCYTES # BLD AUTO: 0.35 K/UL — SIGNIFICANT CHANGE UP (ref 0–0.9)
MONOCYTES NFR BLD AUTO: 10.4 % — SIGNIFICANT CHANGE UP (ref 2–14)
NEUTROPHILS # BLD AUTO: 2.24 K/UL — SIGNIFICANT CHANGE UP (ref 1.8–7.4)
NEUTROPHILS NFR BLD AUTO: 66.9 % — SIGNIFICANT CHANGE UP (ref 43–77)
NRBC # BLD: 0 /100 WBCS — SIGNIFICANT CHANGE UP (ref 0–0)
PLATELET # BLD AUTO: 147 K/UL — LOW (ref 150–400)
RBC # BLD: 3.98 M/UL — SIGNIFICANT CHANGE UP (ref 3.8–5.2)
RBC # FLD: 11.9 % — SIGNIFICANT CHANGE UP (ref 10.3–14.5)
WBC # BLD: 3.35 K/UL — LOW (ref 3.8–10.5)
WBC # FLD AUTO: 3.35 K/UL — LOW (ref 3.8–10.5)

## 2023-03-13 PROCEDURE — 99215 OFFICE O/P EST HI 40 MIN: CPT

## 2023-03-13 NOTE — RESULTS/DATA
[FreeTextEntry1] : 2/27/23 PET: Previously seen finding at the cervical stump appears somewhat smaller on the low-dose CT.   Areas of hypermetabolic activity seen there appear smaller as well, although remain quantitatively unchanged. Findings are consistent with response to therapy. Stable pulmonary findings without abnormal uptake.Otherwise, no new suspicious findings. REPRODUCTIVE ORGANS: Status-post DIANA/BSO. Previously seen activity at the cervical stump has decreased in area, although remains quantitatively stable with regards to maximum intensity. This now measures 4.8 x 3.7 cm (SUV 4.2, previously 5.5 x 4.0 cm, SUV 4.9).\par \par 9/12/22 MRI Pelvis: In a patient with positive biopsy of the cervical stump there appears be prominent cervix with cystic change without measurable mass. There is no evidence of extension into surrounding parametrium. No adenopathy can be seen.\par \par 9/1/22 Path: PAP: Cervical epithelial cell abnormality. Atypical glandular cells present, favor neoplastic.\par \par 8/24/22 PET: Hypermetabolic soft tissue mass arising from the LEFT portion of the vaginal cuff, consistent with malignancy. Nonspecific findings in the skeleton; there is an area of non--periarticular uptake within L1 where malignancy is not excluded. Remaining findings are indeterminate but are favored to be degenerative. Micronodularity along the LEFT oblique fissure, likely inflammatory.\par Addendum: \par Patient was seen by the referring service on 8/30/2022. Patient was confirmed on physical examination to have a residual cervical stump after supracervical hysterectomy, clinically consistent with an endocervical tumor (?barrel cervix?) on exam, as well as being biopsy-proven. The hypermetabolic soft tissue mass described in the original report is therefore not arising from the vaginal cuff but in fact from the cervical stump. No other changes to original report.\par \par 8/8/22 Path: Cervical biopsy: Atypical endocervical epithelium. Squamous mucosa with reactive atypia. See part B. \par Part B: Endocervical adenocarcinoma, well-differentiated. Tumor cells positive for P16 and Ki-67 was increased. Er and [place] were negative.

## 2023-03-13 NOTE — PHYSICAL EXAM
[Restricted in physically strenuous activity but ambulatory and able to carry out work of a light or sedentary nature] : Status 1- Restricted in physically strenuous activity but ambulatory and able to carry out work of a light or sedentary nature, e.g., light house work, office work [Normal] : grossly intact [de-identified] : wt stable  [de-identified] : fear of claustrophobia, asphyxiation, chronic

## 2023-03-13 NOTE — REVIEW OF SYSTEMS
[Recent Change In Weight] : ~T recent weight change [Anxiety] : anxiety [Negative] : Allergic/Immunologic [Dysphagia] : no dysphagia [Odynophagia] : no odynophagia [Chest Pain] : no chest pain [Shortness Of Breath] : no shortness of breath [Abdominal Pain] : no abdominal pain [FreeTextEntry2] : wt gain 3 lbs  [de-identified] : fear of asphyxiation

## 2023-03-13 NOTE — HISTORY OF PRESENT ILLNESS
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 She had a colposcopy on 8/8/22 and pathology showed atypical endocervical epithelium. Squamous mucosa with reactive atypia. See part B. Part B: Endocervical adenocarcinoma, well-differentiated. Tumor cells positive for P16 and Ki-67 was increased. Er and MS were negative. She had a PET on 8/24/22 which showed hypermetabolic soft tissue mass arising from the LEFT portion of the vaginal cuff, consistent with malignancy. Nonspecific findings in the skeleton; there is an area of non--periarticular uptake within L1 where malignancy is not excluded. Remaining findings are indeterminate but are favored to be degenerative. Micronodularity along the LEFT oblique fissure, likely inflammatory.\par Addendum: \par Patient was seen by the referring service on 8/30/2022. Patient was confirmed on physical examination to have a residual cervical stump after supracervical hysterectomy, clinically consistent with an endocervical tumor (?barrel cervix?) on exam, as well as being biopsy-proven. The hypermetabolic soft tissue mass described in the original report is therefore not arising from the vaginal cuff but in fact from the cervical stump. No other changes to original report. \par Consult slides from 9/1/22 showed a pathology of cervical epithelial cell abnormality. Atypical glandular cells present, favor neoplastic. On 9/12/22 an MRI pelvis showed in a patient with positive biopsy of the cervical stump there appears be prominent cervix with cystic change without measurable mass. There is no evidence of extension into surrounding parametrium. No adenopathy can be seen. [de-identified] : PMH: Bell's Palsy 2018 (resolved but has hyperacute hearing bilaterally); selective claustrophobia and phobia of asphyxiation which affects her ability to wear a standard mask. \par PSH: tonsillectomy (age 19);  (); tubal ligation with TOP (); supracervical hysterectomy BSO for fibroids (); varicose vein ligation (); blepharoplasty (); right breast biopsy / FNA - benign (cannot recall year). \par Family Hx: Mother breast ca, 66 yo; Father abdominal ca, 68 yo; Brother pancreatic ca, 67; maternal aunt breast ca; maternal cousin breast ca  28 yo \par Social Hx: smoker for 15 years, quit at 30 yo; ETOH wine 2-3 oz once a day; retired, retail and real estate; , lives with her ; family close by  [de-identified] : She completed 6 cycles of Cisplatin on 11/2022 concurrent RT, completed 12/7/22. Her energy is resolving, trying to increase her walking. Ototoxicity + ringing in ears / mild tinnitus due to bells palsy (elected not to have baseline hearing test). She denies N/V; diarrhea; constipation; mouth sores; nail / skin changes; hair loss; neuropathy and change in taste. wt gain 3 lbs.

## 2023-03-14 LAB
ALBUMIN SERPL ELPH-MCNC: 4.3 G/DL — SIGNIFICANT CHANGE UP (ref 3.3–5)
ALP SERPL-CCNC: 83 U/L — SIGNIFICANT CHANGE UP (ref 40–120)
ALT FLD-CCNC: 12 U/L — SIGNIFICANT CHANGE UP (ref 10–45)
ANION GAP SERPL CALC-SCNC: 11 MMOL/L — SIGNIFICANT CHANGE UP (ref 5–17)
AST SERPL-CCNC: 17 U/L — SIGNIFICANT CHANGE UP (ref 10–40)
BILIRUB SERPL-MCNC: 0.2 MG/DL — SIGNIFICANT CHANGE UP (ref 0.2–1.2)
BUN SERPL-MCNC: 20 MG/DL — SIGNIFICANT CHANGE UP (ref 7–23)
CALCIUM SERPL-MCNC: 9.5 MG/DL — SIGNIFICANT CHANGE UP (ref 8.4–10.5)
CHLORIDE SERPL-SCNC: 104 MMOL/L — SIGNIFICANT CHANGE UP (ref 96–108)
CO2 SERPL-SCNC: 26 MMOL/L — SIGNIFICANT CHANGE UP (ref 22–31)
CREAT SERPL-MCNC: 0.89 MG/DL — SIGNIFICANT CHANGE UP (ref 0.5–1.3)
EGFR: 67 ML/MIN/1.73M2 — SIGNIFICANT CHANGE UP
GLUCOSE SERPL-MCNC: 85 MG/DL — SIGNIFICANT CHANGE UP (ref 70–99)
MAGNESIUM SERPL-MCNC: 2 MG/DL — SIGNIFICANT CHANGE UP (ref 1.6–2.6)
POTASSIUM SERPL-MCNC: 4.2 MMOL/L — SIGNIFICANT CHANGE UP (ref 3.5–5.3)
POTASSIUM SERPL-SCNC: 4.2 MMOL/L — SIGNIFICANT CHANGE UP (ref 3.5–5.3)
PROT SERPL-MCNC: 6.5 G/DL — SIGNIFICANT CHANGE UP (ref 6–8.3)
SODIUM SERPL-SCNC: 141 MMOL/L — SIGNIFICANT CHANGE UP (ref 135–145)

## 2023-03-21 ENCOUNTER — APPOINTMENT (OUTPATIENT)
Dept: GYNECOLOGIC ONCOLOGY | Facility: CLINIC | Age: 78
End: 2023-03-21
Payer: MEDICARE

## 2023-03-21 VITALS
DIASTOLIC BLOOD PRESSURE: 84 MMHG | BODY MASS INDEX: 23.73 KG/M2 | WEIGHT: 139 LBS | HEIGHT: 64 IN | SYSTOLIC BLOOD PRESSURE: 136 MMHG | HEART RATE: 68 BPM

## 2023-03-21 PROCEDURE — 99213 OFFICE O/P EST LOW 20 MIN: CPT

## 2023-03-21 RX ORDER — MAGNESIUM OXIDE 241.3 MG/1000MG
400 TABLET ORAL
Qty: 42 | Refills: 2 | Status: DISCONTINUED | COMMUNITY
Start: 2022-11-18 | End: 2023-03-21

## 2023-03-21 NOTE — HISTORY OF PRESENT ILLNESS
[FreeTextEntry1] : Ms. Gonzalez, 77 years old, was referred by Dr. Faulkner (PCP) and Dr. 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  \par Follow up pap on 22 again noted NITIN, favoring neoplasia.  \par Colpo with biopsies 22 identified well differentiated endocervical adenocarcinoma on the ECC.  [facility] review: confirmatory of HPV-associated endocervical adenocarcinoma. \par \par Initial GYN ONC consultation: 22\par 22: MRI report indicates no radiographic parametrial invasion, however this is present by definition based on the exam findings and the recommendation remains for chemoradiation\par \par She tolerated chemoradiation treatment with tolerable side effects.\par Denies any VB or other associated signs or symptoms. \par \par PMH:  Bell's Palsy 2018 (resolved but has hyperacute hearing bilaterally); selective claustrophobia and phobia of asphyxiation which affects her ability to wear a standard mask.  \par PSH:  tonsillectomy (age 19);  ();  tubal ligation with TOP (); supracervical hysterectomy BSO for fibroids (); varicose vein ligation ();  blepharoplasty (); right breast biopsy - benign (cannot recall year).\par \par PAP:\par 22 ASCUS / atypical glandular cells, favor neoplastic. HPV negative.   ([facility])\par 8/3/21:  atypical glandular cells.  Abnormal cytological findings noted.  ([facility])\par \par Pathology:\par 22 Colposcopy ([facility]) - task sent for [facility] path review 22\par Cervix:  12 o'clock:  atypical endocervical epithelium, squamous mucosa with reactive atypia.  \par ECC:  endocervical adenocarcinoma, well differentiated.  Tumor cells positive for P16 and Ki-67 was increased.  ER and DE were negative.  \par \par 9/15/21 Colposcopy [facility]) \par ECC:  endocervical tissue and squamous epithelium with chronic and acute inflammation and reactive atypia.  No evidence of dysplasia.  \par Cervix:  6 o'clock:  Squamous mucosa with chronic inflammation and reactive atypia.  No evidence of dysplasia.  \par Cervix 12 o'clock:  Squamous mucosa with chronic inflammation and reactive atypia.  No evidence of dysplasia.  \par \par Imaging:\par 22 PET CT ([facility]) Indication:  Cervical Cancer\par HEAD/NECK: Physiologic FDG activity in visualized brain, head, and neck.\par CHEST: NO abnormal FDG activity. NO lymphadenopathy. Atherosclerotic calcification of nonaneurysmal thoracic aorta.\par LUNGS: 0.3 cm nodule along the LEFT oblique fissure, below resolution of PET without ABNORMAL uptake. Lung fields otherwise unremarkable.\par PLEURA/PERICARDIUM: NO abnormal FDG activity. NO effusions.\par HEPATOBILIARY/PANCREAS: Physiologic FDG activity. Liver SUV mean is 3.5.\par SPLEEN:  Physiologic FDG activity. NORMAL size.\par ADRENAL GLANDS: NO abnormal FDG activity.\par KIDNEYS/URINARY BLADDER: Physiologic excreted FDG activity.\par REPRODUCTIVE ORGANS: Status-post DIANA/BSO. Arising from the LEFT aspect of the vaginal cuff is a 5.5 x 3.5 cm soft tissue mass, with heterogeneous, moderately intense uptake (SUV 5.3, image 223). This measures approximately 4.8 cm on sagittal imaging.\par ABDOMINOPELVIC NODES: NO enlarged or FDG-avid lymph node.\par BOWEL/PERITONEUM/MESENTERY: NO abnormal FDG activity. There is a small hiatal hernia. Oral contrast has reached cecum.\par ABDOMINAL WALL: NO hypermetabolic periumbilical ("Sister Missy Jacques") nodes/nodules. NO abnormal FDG activity otherwise noted at abdominal wall.\par BONES/SOFT TISSUES/VESSELS: There is a focus seen within L1 at its LEFT anterolateral aspect where there is some mild change in the marrow in this does not appear to be strictly periarticular (SUV 3.8, image 140). Additional foci seen in the spine otherwise may be degenerative. For example, a focus with  SUV 4.4, posterolateral portion of L1 vertebral body at the inferior endplate appears to be adjacent to the pedicle, but on sagittal imaging, there is marked degenerative change seen there, with a possible Schmorl's node. There are punctate foci of increased uptake seen at the pelvic bones, but these largely appear to be periarticular and may be degenerative (i.e. SUV 3.8, image 182).  Atherosclerotic calcification of nonaneurysmal abdominal aorta including visceral and/or runoff branches.\par IMPRESSION:\par 1.  Hypermetabolic soft tissue mass arising from the LEFT portion of the vaginal cuff, consistent with malignancy.\par 2.  Nonspecific findings in the skeleton; there is an area of non--periarticular uptake within L1 where malignancy is not excluded. Remaining findings are indeterminate but are favored to be degenerative. MRI may be helpful.\par 3.  Micronodularity along the LEFT oblique fissure, likely inflammatory.\par \par MRI pelvis 22:\par  In a patient with positive biopsy of the cervical stump there appears be prominent cervix with cystic change without measurable mass. There is no evidence of extension into surrounding parametrium. No adenopathy can be seen.\par \par She completed external beam radiation therapy with Dr. Mahoney on 11/15/22 5000 cGy in 25 fractions to pelvis/cervix, and 6 cycles of concurrent Cisplatin.\par She then received brachytherapy 4 fractions of cervical tumor boost to 2800 cGy last treatment on 22 with Dr. Colmenares. She tolerated the treatment well.\par \par 23 PET/CT\par IMPRESSION: Since prior FDG-PET/CT scan 2022:\par 1.  Previously seen finding at the cervical stump appears somewhat smaller on the low-dose CT. Areas of hypermetabolic activity seen there appear smaller as well, although remain quantitatively unchanged. Findings are consistent with response to therapy. Attention to follow-up.\par 2.  Stable pulmonary findings without abnormal uptake.\par 3.  Otherwise, no new suspicious findings.\par *** ADDENDUM # 1 ***\par REPRODUCTIVE ORGANS: Status-post DIANA/BSO. Previously seen activity at the cervical stump has decreased in area, although remains quantitatively stable with regards to maximum intensity. Note that optimal evaluation of activity here is limited by proximity to the urinary bladder. When measured at the approximate same level as previously seen, this now measures 4.8 x 3.7 cm (SUV 4.2, image 234, previously 5.5 x 4.0 cm, SUV 4.9, prior image 223, remeasured).\par \par [facility] Pathology:  2002:\par 1) skin:  abdominal scar\par 2)  Left adnexa:  ovary and fallopian tube with no significant pathologic alternations.\par 3)  Right Adnexa:  simple cyst of ovary;  fallopian tube with mild patchy chronic inflammation and reactive changes;  paratubal cyst.\par 4)  Uterus:  Inactive basal layer endometrium;  leiomyomata, subserosal.  \par \par Health Maintenance:\par BMI: 21\par COVID vaccine received:  Yes + booster\par Mammogram:  22 BIRADS 2 ([facility])\par Colonoscopy: 2020 \par PAP: See above\par \par GYN/Ref:  Ariadna Espinosa DO\par PCP:  Dr. Yahaira Faulkner\par GI:  Dr. Erik Marroquin

## 2023-03-21 NOTE — PHYSICAL EXAM
[Chaperone Present] : A chaperone was present in the examining room during all aspects of the physical examination [Absent] : Adnexa(ae): Absent [Abnormal] : Bimanual Exam: Abnormal [Normal] : Recto-Vaginal Exam: Normal [Fully active, able to carry on all pre-disease performance without restriction] : Status 0 - Fully active, able to carry on all pre-disease performance without restriction [de-identified] :  scar (Pfannenstiel) [de-identified] : post-RT changes noted with residual fibrinous tissue on cervix, however there is no visible residual tumor and the cervix is mobile, with baseline shortening of the left parametrium, persistent tethering cervix to left with resultant loss of the left vaginal fornix on exam. Right parametrium is free. Very minimal  friability. [de-identified] : RV septum free

## 2023-03-21 NOTE — LETTER BODY
[Dear  ___] : Dear  [unfilled], [I recently saw our patient [unfilled] for a follow-up visit.] : I recently saw our patient, [unfilled] for a follow-up visit. [Attached please find my note.] : Attached please find my note. [FreeTextEntry2] : She completed chemoradiation and will see me regularly for followup.\par I will keep you updated on her progress. \par Thank you again for referring this lucretia patient.

## 2023-05-21 ENCOUNTER — OUTPATIENT (OUTPATIENT)
Dept: OUTPATIENT SERVICES | Facility: HOSPITAL | Age: 78
LOS: 1 days | Discharge: HOME | End: 2023-05-21

## 2023-05-21 DIAGNOSIS — Z90.710 ACQUIRED ABSENCE OF BOTH CERVIX AND UTERUS: Chronic | ICD-10-CM

## 2023-05-21 DIAGNOSIS — C53.9 MALIGNANT NEOPLASM OF CERVIX UTERI, UNSPECIFIED: ICD-10-CM

## 2023-05-21 DIAGNOSIS — Z98.890 OTHER SPECIFIED POSTPROCEDURAL STATES: Chronic | ICD-10-CM

## 2023-05-21 DIAGNOSIS — Z98.51 TUBAL LIGATION STATUS: Chronic | ICD-10-CM

## 2023-05-21 DIAGNOSIS — Z98.891 HISTORY OF UTERINE SCAR FROM PREVIOUS SURGERY: Chronic | ICD-10-CM

## 2023-06-01 ENCOUNTER — APPOINTMENT (OUTPATIENT)
Dept: NUCLEAR MEDICINE | Facility: CLINIC | Age: 78
End: 2023-06-01
Payer: MEDICARE

## 2023-06-01 ENCOUNTER — OUTPATIENT (OUTPATIENT)
Dept: OUTPATIENT SERVICES | Facility: HOSPITAL | Age: 78
LOS: 1 days | End: 2023-06-01
Payer: MEDICARE

## 2023-06-01 DIAGNOSIS — Z98.891 HISTORY OF UTERINE SCAR FROM PREVIOUS SURGERY: Chronic | ICD-10-CM

## 2023-06-01 DIAGNOSIS — C53.9 MALIGNANT NEOPLASM OF CERVIX UTERI, UNSPECIFIED: ICD-10-CM

## 2023-06-01 DIAGNOSIS — Z98.51 TUBAL LIGATION STATUS: Chronic | ICD-10-CM

## 2023-06-01 DIAGNOSIS — Z98.890 OTHER SPECIFIED POSTPROCEDURAL STATES: Chronic | ICD-10-CM

## 2023-06-01 DIAGNOSIS — Z90.710 ACQUIRED ABSENCE OF BOTH CERVIX AND UTERUS: Chronic | ICD-10-CM

## 2023-06-01 PROCEDURE — 78815 PET IMAGE W/CT SKULL-THIGH: CPT

## 2023-06-01 PROCEDURE — 78815 PET IMAGE W/CT SKULL-THIGH: CPT | Mod: 26,PS,MH

## 2023-06-01 PROCEDURE — A9552: CPT

## 2023-06-05 ENCOUNTER — APPOINTMENT (OUTPATIENT)
Dept: HEMATOLOGY ONCOLOGY | Facility: CLINIC | Age: 78
End: 2023-06-05
Payer: MEDICARE

## 2023-06-05 VITALS
HEIGHT: 64 IN | SYSTOLIC BLOOD PRESSURE: 130 MMHG | RESPIRATION RATE: 16 BRPM | DIASTOLIC BLOOD PRESSURE: 70 MMHG | HEART RATE: 66 BPM | WEIGHT: 142.53 LBS | BODY MASS INDEX: 24.33 KG/M2

## 2023-06-05 PROCEDURE — 99215 OFFICE O/P EST HI 40 MIN: CPT

## 2023-06-05 NOTE — REVIEW OF SYSTEMS
[Recent Change In Weight] : ~T recent weight change [Anxiety] : anxiety [Negative] : Allergic/Immunologic [Diarrhea: Grade 0] : Diarrhea: Grade 0 [Dysphagia] : no dysphagia [Odynophagia] : no odynophagia [Chest Pain] : no chest pain [Shortness Of Breath] : no shortness of breath [Abdominal Pain] : no abdominal pain [Vaginal Discharge] : no vaginal discharge [Dysmenorrhea/Abn Vaginal Bleeding] : no dysmenorrhea/abnormal vaginal bleeding [FreeTextEntry2] : wt gain 3 lbs  [de-identified] : fear of asphyxiation

## 2023-06-05 NOTE — PHYSICAL EXAM
[Normal] : grossly intact [Fully active, able to carry on all pre-disease performance without restriction] : Status 0 - Fully active, able to carry on all pre-disease performance without restriction [de-identified] : wt gain 3 lbs  [de-identified] : fear of claustrophobia, asphyxiation, chronic

## 2023-06-05 NOTE — RESULTS/DATA
[FreeTextEntry1] : 6/1/23 PET: Overall stable soft tissue prominence in the left side of the cervical stump with smaller hypodense component and no focal abnormal activity that is concerning for locally recurrent FDG avid malignant process. No suspicious FDG avid lesion in the remaining field-of-view.\par \par 2/27/23 PET: Previously seen finding at the cervical stump appears somewhat smaller on the low-dose CT.   Areas of hypermetabolic activity seen there appear smaller as well, although remain quantitatively unchanged. Findings are consistent with response to therapy. Stable pulmonary findings without abnormal uptake.Otherwise, no new suspicious findings. REPRODUCTIVE ORGANS: Status-post DIANA/BSO. Previously seen activity at the cervical stump has decreased in area, although remains quantitatively stable with regards to maximum intensity. This now measures 4.8 x 3.7 cm (SUV 4.2, previously 5.5 x 4.0 cm, SUV 4.9).\par \par 9/12/22 MRI Pelvis: In a patient with positive biopsy of the cervical stump there appears be prominent cervix with cystic change without measurable mass. There is no evidence of extension into surrounding parametrium. No adenopathy can be seen.\par \par 9/1/22 Path: PAP: Cervical epithelial cell abnormality. Atypical glandular cells present, favor neoplastic.\par \par 8/24/22 PET: Hypermetabolic soft tissue mass arising from the LEFT portion of the vaginal cuff, consistent with malignancy. Nonspecific findings in the skeleton; there is an area of non--periarticular uptake within L1 where malignancy is not excluded. Remaining findings are indeterminate but are favored to be degenerative. Micronodularity along the LEFT oblique fissure, likely inflammatory.\par Addendum: \par Patient was seen by the referring service on 8/30/2022. Patient was confirmed on physical examination to have a residual cervical stump after supracervical hysterectomy, clinically consistent with an endocervical tumor (?barrel cervix?) on exam, as well as being biopsy-proven. The hypermetabolic soft tissue mass described in the original report is therefore not arising from the vaginal cuff but in fact from the cervical stump. No other changes to original report.\par \par 8/8/22 Path: Cervical biopsy: Atypical endocervical epithelium. Squamous mucosa with reactive atypia. See part B. \par Part B: Endocervical adenocarcinoma, well-differentiated. Tumor cells positive for P16 and Ki-67 was increased. Er and CT were negative.

## 2023-06-05 NOTE — HISTORY OF PRESENT ILLNESS
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 She had a colposcopy on 8/8/22 and pathology showed atypical endocervical epithelium. Squamous mucosa with reactive atypia. See part B. Part B: Endocervical adenocarcinoma, well-differentiated. Tumor cells positive for P16 and Ki-67 was increased. Er and NH were negative. She had a PET on 8/24/22 which showed hypermetabolic soft tissue mass arising from the LEFT portion of the vaginal cuff, consistent with malignancy. Nonspecific findings in the skeleton; there is an area of non--periarticular uptake within L1 where malignancy is not excluded. Remaining findings are indeterminate but are favored to be degenerative. Micronodularity along the LEFT oblique fissure, likely inflammatory.\par Addendum: \par Patient was seen by the referring service on 8/30/2022. Patient was confirmed on physical examination to have a residual cervical stump after supracervical hysterectomy, clinically consistent with an endocervical tumor (?barrel cervix?) on exam, as well as being biopsy-proven. The hypermetabolic soft tissue mass described in the original report is therefore not arising from the vaginal cuff but in fact from the cervical stump. No other changes to original report. \par Consult slides from 9/1/22 showed a pathology of cervical epithelial cell abnormality. Atypical glandular cells present, favor neoplastic. On 9/12/22 an MRI pelvis showed in a patient with positive biopsy of the cervical stump there appears be prominent cervix with cystic change without measurable mass. There is no evidence of extension into surrounding parametrium. No adenopathy can be seen. [de-identified] : PMH: Bell's Palsy 2018 (resolved but has hyperacute hearing bilaterally); selective claustrophobia and phobia of asphyxiation which affects her ability to wear a standard mask. \par PSH: tonsillectomy (age 19);  (); tubal ligation with TOP (); supracervical hysterectomy BSO for fibroids (); varicose vein ligation (); blepharoplasty (); right breast biopsy / FNA - benign (cannot recall year). \par Family Hx: Mother breast ca, 66 yo; Father abdominal ca, 68 yo; Brother pancreatic ca, 67; maternal aunt breast ca; maternal cousin breast ca  28 yo \par Social Hx: smoker for 15 years, quit at 32 yo; ETOH wine 2-3 oz once a day; retired, retail and real estate; , lives with her ; family close by  [de-identified] : She completed 6 cycles of Cisplatin on 11/2022 concurrent RT, completed 12/7/22. Ringing in ears / mild tinnitus continue due to bells palsy, chronic. She walked a 5K last week and is performing yoga regularly. She feels well today. \par \par She is seeing GYN/ONC tomorrow and her GYN next month, part of [facility].

## 2023-06-06 ENCOUNTER — APPOINTMENT (OUTPATIENT)
Dept: GYNECOLOGIC ONCOLOGY | Facility: CLINIC | Age: 78
End: 2023-06-06
Payer: MEDICARE

## 2023-06-06 VITALS
HEART RATE: 80 BPM | BODY MASS INDEX: 24.24 KG/M2 | WEIGHT: 142 LBS | DIASTOLIC BLOOD PRESSURE: 86 MMHG | SYSTOLIC BLOOD PRESSURE: 160 MMHG | HEIGHT: 64 IN

## 2023-06-06 PROCEDURE — 99213 OFFICE O/P EST LOW 20 MIN: CPT

## 2023-06-06 RX ORDER — AMOXICILLIN 875 MG/1
875 TABLET, FILM COATED ORAL
Qty: 14 | Refills: 0 | Status: COMPLETED | COMMUNITY
Start: 2023-02-15

## 2023-06-06 RX ORDER — PSYLLIUM HUSK 0.4 G
CAPSULE ORAL
Refills: 0 | Status: ACTIVE | COMMUNITY

## 2023-06-06 RX ORDER — CHLORHEXIDINE GLUCONATE, 0.12% ORAL RINSE 1.2 MG/ML
0.12 SOLUTION DENTAL
Qty: 473 | Refills: 0 | Status: COMPLETED | COMMUNITY
Start: 2023-02-15

## 2023-06-06 NOTE — LETTER BODY
[Dear  ___] : Dear  [unfilled], [I recently saw our patient [unfilled] for a follow-up visit.] : I recently saw our patient, [unfilled] for a follow-up visit. [Attached please find my note.] : Attached please find my note. [FreeTextEntry2] : She completed chemoradiation and will see me regularly for followup. I will perform all PAPs. She will continue to see you for routine GYN health maintenance. \par I will keep you updated on her progress. \par Thank you again for referring this lucretia patient.

## 2023-06-06 NOTE — PHYSICAL EXAM
[Chaperone Present] : A chaperone was present in the examining room during all aspects of the physical examination [Absent] : Adnexa(ae): Absent [Abnormal] : Bimanual Exam: Abnormal [Normal] : Recto-Vaginal Exam: Normal [Fully active, able to carry on all pre-disease performance without restriction] : Status 0 - Fully active, able to carry on all pre-disease performance without restriction [de-identified] :  scar (Pfannenstiel) [de-identified] : post-RT changes noted with residual fibrinous tissue on cervix, however there is no visible residual tumor and the cervix is mobile, with baseline shortening of the left parametrium, persistent tethering cervix to left with resultant loss of the left vaginal fornix on exam. Right parametrium is free. Very minimal  friability. [de-identified] : RV septum free

## 2023-06-21 NOTE — RESULTS/DATA
No [FreeTextEntry1] : 9/12/22 MRI Pelvis: In a patient with positive biopsy of the cervical stump there appears be prominent cervix with cystic change without measurable mass. There is no evidence of extension into surrounding parametrium. No adenopathy can be seen.\par \par 9/1/22 Path: PAP: Cervical epithelial cell abnormality. Atypical glandular cells present, favor neoplastic.\par \par 8/24/22 PET: Hypermetabolic soft tissue mass arising from the LEFT portion of the vaginal cuff, consistent with malignancy. Nonspecific findings in the skeleton; there is an area of non--periarticular uptake within L1 where malignancy is not excluded. Remaining findings are indeterminate but are favored to be degenerative. Micronodularity along the LEFT oblique fissure, likely inflammatory.\par Addendum: \par Patient was seen by the referring service on 8/30/2022. Patient was confirmed on physical examination to have a residual cervical stump after supracervical hysterectomy, clinically consistent with an endocervical tumor (?barrel cervix?) on exam, as well as being biopsy-proven. The hypermetabolic soft tissue mass described in the original report is therefore not arising from the vaginal cuff but in fact from the cervical stump. No other changes to original report.\par \par 8/8/22 Path: Cervical biopsy: Atypical endocervical epithelium. Squamous mucosa with reactive atypia. See part B. \par Part B: Endocervical adenocarcinoma, well-differentiated. Tumor cells positive for P16 and Ki-67 was increased. Er and RI were negative.

## 2023-07-11 ENCOUNTER — OFFICE (OUTPATIENT)
Dept: URBAN - METROPOLITAN AREA CLINIC 102 | Facility: CLINIC | Age: 78
Setting detail: OPHTHALMOLOGY
End: 2023-07-11
Payer: MEDICARE

## 2023-07-11 DIAGNOSIS — H16.223: ICD-10-CM

## 2023-07-11 DIAGNOSIS — H40.033: ICD-10-CM

## 2023-07-11 DIAGNOSIS — H53.2: ICD-10-CM

## 2023-07-11 DIAGNOSIS — G51.0: ICD-10-CM

## 2023-07-11 DIAGNOSIS — H25.13: ICD-10-CM

## 2023-07-11 PROCEDURE — 92083 EXTENDED VISUAL FIELD XM: CPT | Performed by: OPHTHALMOLOGY

## 2023-07-11 PROCEDURE — 92020 GONIOSCOPY: CPT | Performed by: OPHTHALMOLOGY

## 2023-07-11 PROCEDURE — 92014 COMPRE OPH EXAM EST PT 1/>: CPT | Performed by: OPHTHALMOLOGY

## 2023-07-11 ASSESSMENT — REFRACTION_CURRENTRX
OS_AXIS: 95
OD_VPRISM_DIRECTION: SV
OD_SPHERE: +2.25
OS_VPRISM_DIRECTION: SV
OS_SPHERE: +2.25
OS_CYLINDER: -4.00
OD_OVR_VA: 20/
OD_CYLINDER: -3.00
OS_OVR_VA: 20/
OD_AXIS: 104

## 2023-07-11 ASSESSMENT — REFRACTION_AUTOREFRACTION
OS_AXIS: 094
OD_AXIS: 106
OS_CYLINDER: -3.25
OS_SPHERE: +2.50
OD_SPHERE: +3.50
OD_CYLINDER: -3.50

## 2023-07-11 ASSESSMENT — KERATOMETRY
OD_AXISANGLE_DEGREES: 012
OD_K1POWER_DIOPTERS: 44.00
OS_AXISANGLE_DEGREES: 179
OD_K2POWER_DIOPTERS: 47.00
OS_K2POWER_DIOPTERS: 47.25
OS_K1POWER_DIOPTERS: 44.50
METHOD_AUTO_MANUAL: AUTO

## 2023-07-11 ASSESSMENT — TONOMETRY
OD_IOP_MMHG: 15
OD_IOP_MMHG: 17
OS_IOP_MMHG: 13
OS_IOP_MMHG: 16

## 2023-07-11 ASSESSMENT — CONFRONTATIONAL VISUAL FIELD TEST (CVF)
OD_FINDINGS: FULL
OS_FINDINGS: FULL

## 2023-07-11 ASSESSMENT — VISUAL ACUITY
OD_BCVA: 20/25-1
OS_BCVA: 20/25

## 2023-07-11 ASSESSMENT — SPHEQUIV_DERIVED
OD_SPHEQUIV: 1.75
OS_SPHEQUIV: 0.875

## 2023-07-11 ASSESSMENT — AXIALLENGTH_DERIVED
OS_AL: 22.438
OD_AL: 22.2557

## 2023-07-30 ENCOUNTER — LABORATORY RESULT (OUTPATIENT)
Age: 78
End: 2023-07-30

## 2023-07-31 ENCOUNTER — APPOINTMENT (OUTPATIENT)
Dept: FAMILY MEDICINE | Facility: CLINIC | Age: 78
End: 2023-07-31
Payer: MEDICARE

## 2023-07-31 ENCOUNTER — NON-APPOINTMENT (OUTPATIENT)
Age: 78
End: 2023-07-31

## 2023-07-31 VITALS
WEIGHT: 143 LBS | TEMPERATURE: 97.7 F | HEIGHT: 64 IN | OXYGEN SATURATION: 96 % | SYSTOLIC BLOOD PRESSURE: 130 MMHG | BODY MASS INDEX: 24.41 KG/M2 | HEART RATE: 61 BPM | DIASTOLIC BLOOD PRESSURE: 76 MMHG

## 2023-07-31 DIAGNOSIS — R73.01 IMPAIRED FASTING GLUCOSE: ICD-10-CM

## 2023-07-31 DIAGNOSIS — R00.1 BRADYCARDIA, UNSPECIFIED: ICD-10-CM

## 2023-07-31 DIAGNOSIS — Z00.00 ENCOUNTER FOR GENERAL ADULT MEDICAL EXAMINATION W/OUT ABNORMAL FINDINGS: ICD-10-CM

## 2023-07-31 DIAGNOSIS — Z80.3 FAMILY HISTORY OF MALIGNANT NEOPLASM OF BREAST: ICD-10-CM

## 2023-07-31 DIAGNOSIS — E03.8 OTHER SPECIFIED HYPOTHYROIDISM: ICD-10-CM

## 2023-07-31 DIAGNOSIS — M15.9 POLYOSTEOARTHRITIS, UNSPECIFIED: ICD-10-CM

## 2023-07-31 PROCEDURE — 93000 ELECTROCARDIOGRAM COMPLETE: CPT

## 2023-07-31 PROCEDURE — G0439: CPT

## 2023-07-31 RX ORDER — METHYLPREDNISOLONE 4 MG/1
4 TABLET ORAL
Qty: 21 | Refills: 0 | Status: DISCONTINUED | COMMUNITY
Start: 2023-06-11

## 2023-07-31 NOTE — PHYSICAL EXAM
[No Acute Distress] : no acute distress [Well Nourished] : well nourished [Well Developed] : well developed [Well-Appearing] : well-appearing [Normal Sclera/Conjunctiva] : normal sclera/conjunctiva [EOMI] : extraocular movements intact [Normal Outer Ear/Nose] : the outer ears and nose were normal in appearance [Normal Oropharynx] : the oropharynx was normal [No Lymphadenopathy] : no lymphadenopathy [Supple] : supple [Thyroid Normal, No Nodules] : the thyroid was normal and there were no nodules present [No Respiratory Distress] : no respiratory distress  [No Accessory Muscle Use] : no accessory muscle use [Clear to Auscultation] : lungs were clear to auscultation bilaterally [Normal Rate] : normal rate  [Regular Rhythm] : with a regular rhythm [Normal S1, S2] : normal S1 and S2 [No Murmur] : no murmur heard [No Carotid Bruits] : no carotid bruits [No Abdominal Bruit] : a ~M bruit was not heard ~T in the abdomen [No Varicosities] : no varicosities [Pedal Pulses Present] : the pedal pulses are present [No Edema] : there was no peripheral edema [No Palpable Aorta] : no palpable aorta [No Extremity Clubbing/Cyanosis] : no extremity clubbing/cyanosis [Soft] : abdomen soft [Non Tender] : non-tender [Non-distended] : non-distended [No Masses] : no abdominal mass palpated [Normal Bowel Sounds] : normal bowel sounds [No HSM] : no HSM [Normal Posterior Cervical Nodes] : no posterior cervical lymphadenopathy [Normal Anterior Cervical Nodes] : no anterior cervical lymphadenopathy [No Joint Swelling] : no joint swelling [No Rash] : no rash [Coordination Grossly Intact] : coordination grossly intact [Normal Gait] : normal gait [No Focal Deficits] : no focal deficits [Normal Affect] : the affect was normal [Normal Mood] : the mood was normal [Normal Insight/Judgement] : insight and judgment were intact [de-identified] : no s/sxs acute synovitis, L patella with new bony prominence from fall a few months ago (revd this is likely chronic) but no edema [de-identified] : +tanned skin with solar lentigos and freckles

## 2023-07-31 NOTE — PLAN
[FreeTextEntry1] : Continue all medications as prescribed. Check labs as above. Will adjust any medications based upon lab results.  Reviewed age-appropriate preventive screening tests with patient. UTD On gyn exams, colonoscopy (but due again in 12/2023 and recommended she schedule consultation now; referral/order entered for Dr. Figueroa), mammogram (due again in 8/2023) and DEXA.   Revd/recommended tdap booster,and PCV 20.   ECG SB, +artifact. We are having technical issues with ECG system during computer upgrade today. She has no cardiac sxs so we will repeat ECG at next visit or two and if she ever has any cardiac sxs she can RTO for sooner eval/repeat ECG  Reviewed diagnosis of impaired fasting glucose (pre-diabetes) and risk for progression to diabetes. Reviewed dietary/lifestyle measures that can help to improve glucose and A1C levels over time and decrease risk for progression to diabetes, including eating cleanly (eg Mediterranean style eating plan), limiting/avoiding white flour carbohydrates and added sugars/sweeteners, pairing proteins with carbohydrates, higher fiber intake in diet, exercising regularly including cardio and strength training, achieving and maintaining a normal/near normal weight/BMI etc. We will monitor glucose and HgbA1C trends over time.   Discussed clean eating (eg Mediterranean style eating plan) and regular exercise/staying as physically active as possible.  Include balance exercises and strength training and core strengthening exercises for bone health and to decrease risk for falls.  I again revd r/b/se continued HT (incl incr risk for malignancy) use and asked Amy to d/w gyn and gyn onc re d/c HT (see HPI for addtl info). Revd that I remain concerned about risks associated with long term HT use at this point in her life.  Reviewed importance of good self care (e.g. meditation, yoga, adequate rest, regular exercise, magnesium, clean eating, etc.).  Follow up with specialists as recommended by them.   Follow up for next physical in one year..

## 2023-07-31 NOTE — REVIEW OF SYSTEMS
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

## 2023-07-31 NOTE — HEALTH RISK ASSESSMENT
[No falls in past year] : Patient reported no falls in the past year [0] : 2) Feeling down, depressed, or hopeless: Not at all (0) [PHQ-2 Negative - No further assessment needed] : PHQ-2 Negative - No further assessment needed [Former] : Former [20 or more] : 20 or more [> 15 Years] : > 15 Years [[id number]] : 0

## 2023-07-31 NOTE — ASSESSMENT
[FreeTextEntry1] : MALISSA MURCIA is a 77 year old female here for a physical exam.  She is also here to follow up on medical issues as noted above.

## 2023-07-31 NOTE — HISTORY OF PRESENT ILLNESS
[FreeTextEntry1] : MALISSA MURCIA is a 77 year old female here for a physical exam. [de-identified] : Her last physical exam was last year  Vaccines: Tetanus is NOT up to date, Tdap 2008  Pneumococcal vaccination is NOT up to date Shingrix is  up to date, had one dose 7/2022 and had second dose in 12/2022 or 1/2023 and will try to clarify date  COVID vaccine is up to date  Her last dentist visit was less than one year ago Her last eye doctor appointment was less than one year ago Her last dermatologist visit was less than one year ago  GYN visit is up to date, Summer 2022 Dr. Espinosa and has appt 8/2023 also, and now also has gyn exams with Dr. Schmidt as is s/p chemo and radiation treatment of cervical cancer dx 8/2022. Plan is for her to have repeat pap at next visit with Dr. Schmidt once cervical tissue has healed from RT.  Mammogram is up to date, 8/2022 Colon cancer screening is up to date but due again soon, colonoscopy 12/2020, polyp, rpt due at 3 yrs, Dr. Lopez ESTES is NOT up to date, 2018 per pt  Her diet is healthy overall Exercise: walking, yoga  Amy has h/o subclinical hypothyroidism, OA, IFG, HSV 2. She was also dx with cervical cancer in 2022. She has mild leukopenia since her chemotherapy, being followed by H/O.   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 Espinosa in 8/2022. She is s/p RT and chemotherapy. She is UTD on followup and surveillance imaging with specialists including Dr. Schmidt (gyn onc surg), Dr. Matos (H/O), and Dr. RAZ Mahoney (rad onc).   Her tumor is ER negative but Dr. Schmidt recommended she d/c her HT. I have also d/w her in the past and again revd today that risks of continued HT use at this stage of her life likely outweigh benefits and hannah in light of new gyn malignancy I strongly recommend she d/c HT at this point in her life. SHe is very reluctant to do so as has had severe hot flashes every time she has tried to d/c med in past and even gets occas hot flash while on HT . I suggested as a starting point that she try decreasing to QOD and perhaps start this in Fall once weather is not as oppressively hot as it is currently. SHe will d/w gyn and gyn onc to see if they feel this is a reasonable starting point as she might be willing to try this approach

## 2023-08-01 LAB
ALBUMIN SERPL ELPH-MCNC: 4.4 G/DL
ALP BLD-CCNC: 82 U/L
ALT SERPL-CCNC: 14 U/L
ANION GAP SERPL CALC-SCNC: 14 MMOL/L
AST SERPL-CCNC: 18 U/L
BILIRUB SERPL-MCNC: 0.3 MG/DL
BUN SERPL-MCNC: 18 MG/DL
CALCIUM SERPL-MCNC: 9.7 MG/DL
CHLORIDE SERPL-SCNC: 104 MMOL/L
CHOLEST SERPL-MCNC: 185 MG/DL
CO2 SERPL-SCNC: 24 MMOL/L
CREAT SERPL-MCNC: 0.89 MG/DL
EGFR: 67 ML/MIN/1.73M2
ESTIMATED AVERAGE GLUCOSE: 100 MG/DL
GLUCOSE SERPL-MCNC: 98 MG/DL
HBA1C MFR BLD HPLC: 5.1 %
HDLC SERPL-MCNC: 52 MG/DL
LDLC SERPL CALC-MCNC: 114 MG/DL
NONHDLC SERPL-MCNC: 132 MG/DL
POTASSIUM SERPL-SCNC: 4.2 MMOL/L
PROT SERPL-MCNC: 6.5 G/DL
SODIUM SERPL-SCNC: 142 MMOL/L
TRIGL SERPL-MCNC: 102 MG/DL
TSH SERPL-ACNC: 4.76 UIU/ML

## 2023-10-03 ENCOUNTER — APPOINTMENT (OUTPATIENT)
Dept: NUCLEAR MEDICINE | Facility: CLINIC | Age: 78
End: 2023-10-03
Payer: MEDICARE

## 2023-10-03 ENCOUNTER — OUTPATIENT (OUTPATIENT)
Dept: OUTPATIENT SERVICES | Facility: HOSPITAL | Age: 78
LOS: 1 days | End: 2023-10-03
Payer: MEDICARE

## 2023-10-03 DIAGNOSIS — Z98.51 TUBAL LIGATION STATUS: Chronic | ICD-10-CM

## 2023-10-03 DIAGNOSIS — Z90.710 ACQUIRED ABSENCE OF BOTH CERVIX AND UTERUS: Chronic | ICD-10-CM

## 2023-10-03 DIAGNOSIS — C53.9 MALIGNANT NEOPLASM OF CERVIX UTERI, UNSPECIFIED: ICD-10-CM

## 2023-10-03 DIAGNOSIS — Z98.890 OTHER SPECIFIED POSTPROCEDURAL STATES: Chronic | ICD-10-CM

## 2023-10-03 DIAGNOSIS — Z98.891 HISTORY OF UTERINE SCAR FROM PREVIOUS SURGERY: Chronic | ICD-10-CM

## 2023-10-03 PROCEDURE — A9552: CPT

## 2023-10-03 PROCEDURE — 78815 PET IMAGE W/CT SKULL-THIGH: CPT | Mod: 26,PS,MH

## 2023-10-03 PROCEDURE — 78815 PET IMAGE W/CT SKULL-THIGH: CPT

## 2023-10-07 ENCOUNTER — OUTPATIENT (OUTPATIENT)
Dept: OUTPATIENT SERVICES | Facility: HOSPITAL | Age: 78
LOS: 1 days | Discharge: HOME | End: 2023-10-07

## 2023-10-07 DIAGNOSIS — Z90.710 ACQUIRED ABSENCE OF BOTH CERVIX AND UTERUS: Chronic | ICD-10-CM

## 2023-10-07 DIAGNOSIS — C53.9 MALIGNANT NEOPLASM OF CERVIX UTERI, UNSPECIFIED: ICD-10-CM

## 2023-10-07 DIAGNOSIS — Z98.891 HISTORY OF UTERINE SCAR FROM PREVIOUS SURGERY: Chronic | ICD-10-CM

## 2023-10-07 DIAGNOSIS — Z98.51 TUBAL LIGATION STATUS: Chronic | ICD-10-CM

## 2023-10-07 DIAGNOSIS — Z98.890 OTHER SPECIFIED POSTPROCEDURAL STATES: Chronic | ICD-10-CM

## 2023-10-12 ENCOUNTER — APPOINTMENT (OUTPATIENT)
Dept: HEMATOLOGY ONCOLOGY | Facility: CLINIC | Age: 78
End: 2023-10-12
Payer: MEDICARE

## 2023-10-12 ENCOUNTER — RESULT REVIEW (OUTPATIENT)
Age: 78
End: 2023-10-12

## 2023-10-12 VITALS
SYSTOLIC BLOOD PRESSURE: 129 MMHG | WEIGHT: 146.56 LBS | BODY MASS INDEX: 25.16 KG/M2 | RESPIRATION RATE: 17 BRPM | DIASTOLIC BLOOD PRESSURE: 86 MMHG | OXYGEN SATURATION: 97 % | TEMPERATURE: 98.5 F | HEART RATE: 65 BPM

## 2023-10-12 LAB
BASOPHILS # BLD AUTO: 0.02 K/UL — SIGNIFICANT CHANGE UP (ref 0–0.2)
BASOPHILS NFR BLD AUTO: 0.4 % — SIGNIFICANT CHANGE UP (ref 0–2)
EOSINOPHIL # BLD AUTO: 0.2 K/UL — SIGNIFICANT CHANGE UP (ref 0–0.5)
EOSINOPHIL NFR BLD AUTO: 3.7 % — SIGNIFICANT CHANGE UP (ref 0–6)
HCT VFR BLD CALC: 40.2 % — SIGNIFICANT CHANGE UP (ref 34.5–45)
HGB BLD-MCNC: 13.3 G/DL — SIGNIFICANT CHANGE UP (ref 11.5–15.5)
IMM GRANULOCYTES NFR BLD AUTO: 0.4 % — SIGNIFICANT CHANGE UP (ref 0–0.9)
LYMPHOCYTES # BLD AUTO: 0.81 K/UL — LOW (ref 1–3.3)
LYMPHOCYTES # BLD AUTO: 15.2 % — SIGNIFICANT CHANGE UP (ref 13–44)
MCHC RBC-ENTMCNC: 30.2 PG — SIGNIFICANT CHANGE UP (ref 27–34)
MCHC RBC-ENTMCNC: 33.1 GM/DL — SIGNIFICANT CHANGE UP (ref 32–36)
MCV RBC AUTO: 91.4 FL — SIGNIFICANT CHANGE UP (ref 80–100)
MONOCYTES # BLD AUTO: 0.44 K/UL — SIGNIFICANT CHANGE UP (ref 0–0.9)
MONOCYTES NFR BLD AUTO: 8.2 % — SIGNIFICANT CHANGE UP (ref 2–14)
NEUTROPHILS # BLD AUTO: 3.85 K/UL — SIGNIFICANT CHANGE UP (ref 1.8–7.4)
NEUTROPHILS NFR BLD AUTO: 72.1 % — SIGNIFICANT CHANGE UP (ref 43–77)
NRBC # BLD: 0 /100 WBCS — SIGNIFICANT CHANGE UP (ref 0–0)
PLATELET # BLD AUTO: 174 K/UL — SIGNIFICANT CHANGE UP (ref 150–400)
RBC # BLD: 4.4 M/UL — SIGNIFICANT CHANGE UP (ref 3.8–5.2)
RBC # FLD: 12.7 % — SIGNIFICANT CHANGE UP (ref 10.3–14.5)
WBC # BLD: 5.34 K/UL — SIGNIFICANT CHANGE UP (ref 3.8–10.5)
WBC # FLD AUTO: 5.34 K/UL — SIGNIFICANT CHANGE UP (ref 3.8–10.5)

## 2023-10-12 PROCEDURE — 99215 OFFICE O/P EST HI 40 MIN: CPT

## 2023-10-13 ENCOUNTER — APPOINTMENT (OUTPATIENT)
Dept: RADIATION ONCOLOGY | Facility: CLINIC | Age: 78
End: 2023-10-13
Payer: MEDICARE

## 2023-10-13 ENCOUNTER — RESULT REVIEW (OUTPATIENT)
Age: 78
End: 2023-10-13

## 2023-10-13 VITALS — WEIGHT: 146 LBS | BODY MASS INDEX: 24.92 KG/M2 | HEART RATE: 64 BPM | RESPIRATION RATE: 16 BRPM | HEIGHT: 64 IN

## 2023-10-13 LAB
ALBUMIN SERPL ELPH-MCNC: 4.4 G/DL — SIGNIFICANT CHANGE UP (ref 3.3–5)
ALP SERPL-CCNC: 83 U/L — SIGNIFICANT CHANGE UP (ref 40–120)
ALT FLD-CCNC: 18 U/L — SIGNIFICANT CHANGE UP (ref 10–45)
ANION GAP SERPL CALC-SCNC: 12 MMOL/L — SIGNIFICANT CHANGE UP (ref 5–17)
AST SERPL-CCNC: 24 U/L — SIGNIFICANT CHANGE UP (ref 10–40)
BILIRUB SERPL-MCNC: 0.2 MG/DL — SIGNIFICANT CHANGE UP (ref 0.2–1.2)
BUN SERPL-MCNC: 20 MG/DL — SIGNIFICANT CHANGE UP (ref 7–23)
CALCIUM SERPL-MCNC: 9.5 MG/DL — SIGNIFICANT CHANGE UP (ref 8.4–10.5)
CHLORIDE SERPL-SCNC: 106 MMOL/L — SIGNIFICANT CHANGE UP (ref 96–108)
CO2 SERPL-SCNC: 24 MMOL/L — SIGNIFICANT CHANGE UP (ref 22–31)
CREAT SERPL-MCNC: 0.94 MG/DL — SIGNIFICANT CHANGE UP (ref 0.5–1.3)
EGFR: 62 ML/MIN/1.73M2 — SIGNIFICANT CHANGE UP
GLUCOSE SERPL-MCNC: 88 MG/DL — SIGNIFICANT CHANGE UP (ref 70–99)
MAGNESIUM SERPL-MCNC: 2.2 MG/DL — SIGNIFICANT CHANGE UP (ref 1.6–2.6)
POTASSIUM SERPL-MCNC: 4.5 MMOL/L — SIGNIFICANT CHANGE UP (ref 3.5–5.3)
POTASSIUM SERPL-SCNC: 4.5 MMOL/L — SIGNIFICANT CHANGE UP (ref 3.5–5.3)
PROT SERPL-MCNC: 6.8 G/DL — SIGNIFICANT CHANGE UP (ref 6–8.3)
SODIUM SERPL-SCNC: 142 MMOL/L — SIGNIFICANT CHANGE UP (ref 135–145)

## 2023-10-13 PROCEDURE — 99213 OFFICE O/P EST LOW 20 MIN: CPT

## 2023-10-13 RX ORDER — ESTROGENS, CONJUGATED 0.45 MG/1
0.45 TABLET, FILM COATED ORAL
Qty: 90 | Refills: 0 | Status: DISCONTINUED | COMMUNITY
Start: 2020-07-28 | End: 2023-10-13

## 2023-10-13 RX ORDER — ESTROGENS, CONJUGATED 0.3 MG/1
0.3 TABLET, FILM COATED ORAL
Refills: 0 | Status: ACTIVE | COMMUNITY

## 2023-10-25 PROBLEM — H16.222 DRY EYE SYNDROME K SICCA; LEFT EYE: Status: ACTIVE | Noted: 2023-10-25

## 2023-10-30 NOTE — ASSESSMENT
[Work-up necessary to assess local, regional or metastatic recurrence] : Work-up necessary to assess local, regional or metastatic recurrence
yes

## 2023-12-05 ENCOUNTER — APPOINTMENT (OUTPATIENT)
Dept: GYNECOLOGIC ONCOLOGY | Facility: CLINIC | Age: 78
End: 2023-12-05
Payer: MEDICARE

## 2023-12-05 VITALS
HEIGHT: 64 IN | HEART RATE: 68 BPM | DIASTOLIC BLOOD PRESSURE: 102 MMHG | WEIGHT: 146 LBS | SYSTOLIC BLOOD PRESSURE: 142 MMHG | BODY MASS INDEX: 24.92 KG/M2

## 2023-12-05 DIAGNOSIS — C53.8 MALIGNANT NEOPLASM OF OVERLAPPING SITES OF CERVIX UTERI: ICD-10-CM

## 2023-12-05 PROCEDURE — 99213 OFFICE O/P EST LOW 20 MIN: CPT

## 2023-12-28 LAB
CYTOLOGY CVX/VAG DOC THIN PREP: ABNORMAL
HPV HIGH+LOW RISK DNA PNL CVX: NOT DETECTED

## 2023-12-28 NOTE — PHYSICAL EXAM
[de-identified] :  scar (Pfannenstiel) [de-identified] : there is no visible residual tumor and the cervix is mobile, with baseline shortening of the left parametrium, persistent tethering cervix to left with resultant loss of the left vaginal fornix on exam. Right parametrium is free. Very minimal  friability. [de-identified] : RV septum free

## 2023-12-28 NOTE — HISTORY OF PRESENT ILLNESS
[FreeTextEntry1] : Ms. Gonzalez was referred by Dr. Faulkner (PCP) and Dr. 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 She had an NITIN pap 8/3/21 and follow up colpo with biopsies was negative for dysplasia or cancer.   Follow up pap on 22 again noted NITIN, favoring neoplasia.   Colpo with biopsies 22 identified well differentiated endocervical adenocarcinoma on the ECC.  [facility] review: confirmatory of HPV-associated endocervical adenocarcinoma.  Initial GYN ONC consultation: 22  PMH:  Bell's Palsy 2018 (resolved but has hyperacute hearing bilaterally); selective claustrophobia and phobia of asphyxiation which affects her ability to wear a standard mask.   PSH:  tonsillectomy (age 19);  ();  tubal ligation with TOP (); supracervical hysterectomy BSO for fibroids (); varicose vein ligation ();  blepharoplasty (); right breast biopsy - benign (cannot recall year). ---------------------------- Pathology: 22 Colposcopy ([facility]) - task sent for [facility] path review 22 Cervix:  12 o'clock:  atypical endocervical epithelium, squamous mucosa with reactive atypia.   ECC:  endocervical adenocarcinoma, well differentiated.  Tumor cells positive for P16 and Ki-67 was increased.  ER and OK were negative.    Imagin22 PET CT ([facility]) Indication:  Cervical Cancer 1.  Hypermetabolic soft tissue mass arising from the LEFT portion of the vaginal cuff, consistent with malignancy. 2.  Nonspecific findings in the skeleton; there is an area of non--periarticular uptake within L1 where malignancy is not excluded. Remaining findings are indeterminate but are favored to be degenerative. MRI may be helpful. 3.  Micronodularity along the LEFT oblique fissure, likely inflammatory.  22: MRI report indicates no radiographic parametrial invasion, however this is present by definition based on the exam findings and the recommendation remains for chemoradiation  She completed external beam radiation therapy with Dr. Mahoney on 11/15/22 5000 cGy in 25 fractions to pelvis/cervix, and 6 cycles of concurrent Cisplatin. She then received brachytherapy 4 fractions of cervical tumor boost to 2800 cGy last treatment on 22 with Dr. Colmenares. She tolerated the treatment well.  23 PET/CT IMPRESSION: Since prior FDG-PET/CT scan 2022: 1.  Previously seen finding at the cervical stump appears somewhat smaller on the low-dose CT. Areas of hypermetabolic activity seen there appear smaller as well, although remain quantitatively unchanged. Findings are consistent with response to therapy. Attention to follow-up. 2.  Stable pulmonary findings without abnormal uptake. 3.  Otherwise, no new suspicious findings. *** ADDENDUM # 1 *** REPRODUCTIVE ORGANS: Status-post DIANA/BSO. Previously seen activity at the cervical stump has decreased in area, although remains quantitatively stable with regards to maximum intensity. Note that optimal evaluation of activity here is limited by proximity to the urinary bladder. When measured at the approximate same level as previously seen, this now measures 4.8 x 3.7 cm (SUV 4.2, image 234, previously 5.5 x 4.0 cm, SUV 4.9, prior image 223, remeasured).  3/13/23- PET/CT: overall stable soft tissue prominence in the left side of the cervix at the stump with smaller hypodense component and no focal abnormal activity that is concerning for locally recurrent FDG avid malignant process  10/2023- PET/CT 1. Stable treated soft tissue in the left side the cervical stump with no abnormal activity.  2. No suspicious FDG avid malignant disease in the pelvis or elsewhere. --------------------------------------------------- She tolerated chemoradiation treatment with tolerable side effects. Feels well overall.  Denies any VB or other associated signs or symptoms.   Health Maintenance: BMI: 21 COVID vaccine received:  Yes + booster Mammogram:  22 BIRADS 2 ([facility]) Colonoscopy: 2020  PAP: pre-ytx, performed today  GYN/Ref:  Ariadna Espinosa,  PCP:  Dr. Yahaira Faulkner GI:  Dr. Erik Marroquin

## 2023-12-28 NOTE — LETTER BODY
[FreeTextEntry2] : She completed chemoradiation and will see me regularly for followup. I will perform all PAPs. She will continue to see you for routine GYN health maintenance. \par  I will keep you updated on her progress. \par  Thank you again for referring this lucretia patient.  [FreeTextEntry1] : pap/hpv

## 2023-12-28 NOTE — DISCUSSION/SUMMARY
[FreeTextEntry1] : - f/u PAP/HPV (addendum: PAP - scant cellularity; HRHPV (-) ; will repeat at next visit; Patient notified via RN tasklist. LDS) - exam findings discussed - continue dilator use  - risk and s/sx of recurrent disease discussed - f/u with Sunita Matos and Maru in April, and me in July - I have encouraged her to try to discontinue her persistent premarin use. - all questions were answered to her apparent satisfaction.

## 2023-12-28 NOTE — ASSESSMENT
[FreeTextEntry1] : 78 year old with clinical stage IIB endocervical adenocarcinoma of a residual cervical stump after prior supracervical hysterectomy, now s/p chemoRT with no clinical evidence of disease.

## 2024-01-04 ENCOUNTER — APPOINTMENT (OUTPATIENT)
Dept: GASTROENTEROLOGY | Facility: CLINIC | Age: 79
End: 2024-01-04

## 2024-01-22 ENCOUNTER — OFFICE (OUTPATIENT)
Dept: URBAN - METROPOLITAN AREA CLINIC 102 | Facility: CLINIC | Age: 79
Setting detail: OPHTHALMOLOGY
End: 2024-01-22
Payer: MEDICARE

## 2024-01-22 DIAGNOSIS — H90.3: ICD-10-CM

## 2024-01-22 DIAGNOSIS — G51.0: ICD-10-CM

## 2024-01-22 DIAGNOSIS — H40.033: ICD-10-CM

## 2024-01-22 DIAGNOSIS — H53.2: ICD-10-CM

## 2024-01-22 DIAGNOSIS — H25.13: ICD-10-CM

## 2024-01-22 DIAGNOSIS — H16.223: ICD-10-CM

## 2024-01-22 PROCEDURE — 92567 TYMPANOMETRY: CPT | Mod: AB | Performed by: AUDIOLOGIST

## 2024-01-22 PROCEDURE — 92020 GONIOSCOPY: CPT | Performed by: OPHTHALMOLOGY

## 2024-01-22 PROCEDURE — 92557 COMPREHENSIVE HEARING TEST: CPT | Mod: AB | Performed by: AUDIOLOGIST

## 2024-01-22 PROCEDURE — 92014 COMPRE OPH EXAM EST PT 1/>: CPT | Performed by: OPHTHALMOLOGY

## 2024-01-22 ASSESSMENT — REFRACTION_CURRENTRX
OD_CYLINDER: -3.00
OS_AXIS: 95
OS_SPHERE: +2.25
OD_OVR_VA: 20/
OD_VPRISM_DIRECTION: SV
OD_SPHERE: +2.25
OD_AXIS: 104
OS_CYLINDER: -4.00
OS_VPRISM_DIRECTION: SV
OS_OVR_VA: 20/

## 2024-01-22 ASSESSMENT — REFRACTION_AUTOREFRACTION
OS_AXIS: 091
OS_SPHERE: +2.50
OD_SPHERE: +3.25
OD_AXIS: 106
OD_CYLINDER: -3.75
OS_CYLINDER: -3.50

## 2024-01-22 ASSESSMENT — CONFRONTATIONAL VISUAL FIELD TEST (CVF)
OS_FINDINGS: FULL
OD_FINDINGS: FULL

## 2024-01-22 ASSESSMENT — SPHEQUIV_DERIVED
OD_SPHEQUIV: 1.375
OS_SPHEQUIV: 0.75

## 2024-03-26 ENCOUNTER — APPOINTMENT (OUTPATIENT)
Dept: NUCLEAR MEDICINE | Facility: CLINIC | Age: 79
End: 2024-03-26
Payer: MEDICARE

## 2024-03-26 ENCOUNTER — OUTPATIENT (OUTPATIENT)
Dept: OUTPATIENT SERVICES | Facility: HOSPITAL | Age: 79
LOS: 1 days | End: 2024-03-26
Payer: MEDICARE

## 2024-03-26 DIAGNOSIS — Z90.710 ACQUIRED ABSENCE OF BOTH CERVIX AND UTERUS: Chronic | ICD-10-CM

## 2024-03-26 DIAGNOSIS — Z98.891 HISTORY OF UTERINE SCAR FROM PREVIOUS SURGERY: Chronic | ICD-10-CM

## 2024-03-26 DIAGNOSIS — C53.9 MALIGNANT NEOPLASM OF CERVIX UTERI, UNSPECIFIED: ICD-10-CM

## 2024-03-26 DIAGNOSIS — Z98.890 OTHER SPECIFIED POSTPROCEDURAL STATES: Chronic | ICD-10-CM

## 2024-03-26 DIAGNOSIS — Z98.51 TUBAL LIGATION STATUS: Chronic | ICD-10-CM

## 2024-03-26 PROCEDURE — A9552: CPT

## 2024-03-26 PROCEDURE — 78815 PET IMAGE W/CT SKULL-THIGH: CPT

## 2024-03-26 PROCEDURE — 78815 PET IMAGE W/CT SKULL-THIGH: CPT | Mod: 26,PS,KX,MH

## 2024-04-11 ENCOUNTER — OUTPATIENT (OUTPATIENT)
Dept: OUTPATIENT SERVICES | Facility: HOSPITAL | Age: 79
LOS: 1 days | Discharge: HOME | End: 2024-04-11

## 2024-04-11 DIAGNOSIS — C53.9 MALIGNANT NEOPLASM OF CERVIX UTERI, UNSPECIFIED: ICD-10-CM

## 2024-04-11 DIAGNOSIS — Z98.51 TUBAL LIGATION STATUS: Chronic | ICD-10-CM

## 2024-04-11 DIAGNOSIS — Z98.891 HISTORY OF UTERINE SCAR FROM PREVIOUS SURGERY: Chronic | ICD-10-CM

## 2024-04-11 DIAGNOSIS — Z98.890 OTHER SPECIFIED POSTPROCEDURAL STATES: Chronic | ICD-10-CM

## 2024-04-11 DIAGNOSIS — Z90.710 ACQUIRED ABSENCE OF BOTH CERVIX AND UTERUS: Chronic | ICD-10-CM

## 2024-04-12 ENCOUNTER — NON-APPOINTMENT (OUTPATIENT)
Age: 79
End: 2024-04-12

## 2024-04-12 ENCOUNTER — RESULT REVIEW (OUTPATIENT)
Age: 79
End: 2024-04-12

## 2024-04-12 ENCOUNTER — APPOINTMENT (OUTPATIENT)
Dept: HEMATOLOGY ONCOLOGY | Facility: CLINIC | Age: 79
End: 2024-04-12
Payer: MEDICARE

## 2024-04-12 ENCOUNTER — APPOINTMENT (OUTPATIENT)
Dept: HEMATOLOGY ONCOLOGY | Facility: CLINIC | Age: 79
End: 2024-04-12

## 2024-04-12 ENCOUNTER — APPOINTMENT (OUTPATIENT)
Dept: RADIATION ONCOLOGY | Facility: CLINIC | Age: 79
End: 2024-04-12
Payer: MEDICARE

## 2024-04-12 VITALS
DIASTOLIC BLOOD PRESSURE: 77 MMHG | OXYGEN SATURATION: 92 % | TEMPERATURE: 98.1 F | HEIGHT: 64 IN | WEIGHT: 152.38 LBS | HEART RATE: 81 BPM | BODY MASS INDEX: 26.02 KG/M2 | SYSTOLIC BLOOD PRESSURE: 126 MMHG | RESPIRATION RATE: 16 BRPM

## 2024-04-12 VITALS
WEIGHT: 152 LBS | DIASTOLIC BLOOD PRESSURE: 70 MMHG | BODY MASS INDEX: 25.95 KG/M2 | RESPIRATION RATE: 18 BRPM | OXYGEN SATURATION: 96 % | HEIGHT: 64 IN | SYSTOLIC BLOOD PRESSURE: 120 MMHG | HEART RATE: 63 BPM

## 2024-04-12 DIAGNOSIS — C53.9 MALIGNANT NEOPLASM OF CERVIX UTERI, UNSPECIFIED: ICD-10-CM

## 2024-04-12 LAB
BASOPHILS # BLD AUTO: 0.01 K/UL — SIGNIFICANT CHANGE UP (ref 0–0.2)
BASOPHILS NFR BLD AUTO: 0.2 % — SIGNIFICANT CHANGE UP (ref 0–2)
EOSINOPHIL # BLD AUTO: 0.23 K/UL — SIGNIFICANT CHANGE UP (ref 0–0.5)
EOSINOPHIL NFR BLD AUTO: 5.1 % — SIGNIFICANT CHANGE UP (ref 0–6)
HCT VFR BLD CALC: 39.9 % — SIGNIFICANT CHANGE UP (ref 34.5–45)
HGB BLD-MCNC: 13.3 G/DL — SIGNIFICANT CHANGE UP (ref 11.5–15.5)
IMM GRANULOCYTES NFR BLD AUTO: 0.4 % — SIGNIFICANT CHANGE UP (ref 0–0.9)
LYMPHOCYTES # BLD AUTO: 0.85 K/UL — LOW (ref 1–3.3)
LYMPHOCYTES # BLD AUTO: 18.8 % — SIGNIFICANT CHANGE UP (ref 13–44)
MCHC RBC-ENTMCNC: 29.2 PG — SIGNIFICANT CHANGE UP (ref 27–34)
MCHC RBC-ENTMCNC: 33.3 GM/DL — SIGNIFICANT CHANGE UP (ref 32–36)
MCV RBC AUTO: 87.7 FL — SIGNIFICANT CHANGE UP (ref 80–100)
MONOCYTES # BLD AUTO: 0.39 K/UL — SIGNIFICANT CHANGE UP (ref 0–0.9)
MONOCYTES NFR BLD AUTO: 8.6 % — SIGNIFICANT CHANGE UP (ref 2–14)
NEUTROPHILS # BLD AUTO: 3.03 K/UL — SIGNIFICANT CHANGE UP (ref 1.8–7.4)
NEUTROPHILS NFR BLD AUTO: 66.9 % — SIGNIFICANT CHANGE UP (ref 43–77)
NRBC # BLD: 0 /100 WBCS — SIGNIFICANT CHANGE UP (ref 0–0)
PLATELET # BLD AUTO: 178 K/UL — SIGNIFICANT CHANGE UP (ref 150–400)
RBC # BLD: 4.55 M/UL — SIGNIFICANT CHANGE UP (ref 3.8–5.2)
RBC # FLD: 13.1 % — SIGNIFICANT CHANGE UP (ref 10.3–14.5)
WBC # BLD: 4.53 K/UL — SIGNIFICANT CHANGE UP (ref 3.8–10.5)
WBC # FLD AUTO: 4.53 K/UL — SIGNIFICANT CHANGE UP (ref 3.8–10.5)

## 2024-04-12 PROCEDURE — 99213 OFFICE O/P EST LOW 20 MIN: CPT

## 2024-04-12 PROCEDURE — 99214 OFFICE O/P EST MOD 30 MIN: CPT

## 2024-04-12 PROCEDURE — G2211 COMPLEX E/M VISIT ADD ON: CPT

## 2024-04-12 RX ORDER — MULTIVITAMIN
TABLET ORAL
Refills: 0 | Status: ACTIVE | COMMUNITY

## 2024-04-12 RX ORDER — PNV NO.95/FERROUS FUM/FOLIC AC 28MG-0.8MG
TABLET ORAL
Refills: 0 | Status: DISCONTINUED | COMMUNITY
End: 2024-04-12

## 2024-04-12 RX ORDER — CHROMIUM 200 MCG
25 MCG TABLET ORAL
Refills: 0 | Status: DISCONTINUED | COMMUNITY
Start: 2021-05-26 | End: 2024-04-12

## 2024-04-12 NOTE — PHYSICAL EXAM
[] : no respiratory distress [Heart Rate And Rhythm] : heart rate and rhythm were normal [Heart Sounds] : normal S1 and S2 [Bowel Sounds] : normal bowel sounds [Abdomen Soft] : soft [Nondistended] : nondistended [Abdomen Tenderness] : non-tender [Cervical Lymph Nodes Enlarged Posterior Bilaterally] : posterior cervical [Cervical Lymph Nodes Enlarged Anterior Bilaterally] : anterior cervical [Supraclavicular Lymph Nodes Enlarged Bilaterally] : supraclavicular [Normal] : no joint swelling, no clubbing or cyanosis of the fingernails and muscle strength and tone were normal

## 2024-04-12 NOTE — HISTORY OF PRESENT ILLNESS
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 Schmidt.  ECC and biopsy 9/15/2021  showed chronic and acute inflammation, reactive atypia, no evidence of dysplasia.  She had an NITIN pap 8/3/21 and follow up colposcopy with biopsies  was negative for dysplasia or cancer.    Pap on 8/8/22 again noted NITIN, favoring neoplasia. HPV negative.  8/8/22 Colposcopy with biopsies demonstrated well differentiated endocervical adenocarcinoma on the ECC.    She denies bleeding or pain and has always worn a tampon and continues to wear one for "cleanliness reasons."   She also takes Premarin PO for hot flashes.  8/8/22 Colposcopy ([facility]) - task sent for [facility] path review 8/30/22  Cervix:  12 o'clock:  atypical endocervical epithelium, squamous mucosa with reactive atypia.   ECC:  endocervical adenocarcinoma, well differentiated.  Tumor cells positive for P16 and Ki-67 was increased.  ER and GA were negative.    8/24/22 PET CT  1.  Hypermetabolic soft tissue mass arising from the LEFT portion of the vaginal cuff, consistent with malignancy. 2.  Nonspecific findings in the skeleton; there is an area of non--periarticular uptake within L1 where malignancy is not excluded. Remaining findings are indeterminate but are favored to be degenerative. MRI may be helpful. 3.  Micronodularity along the LEFT oblique fissure, likely inflammatory.  MRI pelvis 9/12/22 In a patient with positive biopsy of the cervical stump there appears be prominent cervix with cystic change without measurable mass. There is no evidence of extension into surrounding parametrium. No adenopathy can be seen. She completed external beam radiation therapy on 11/15/22 5000 cGy in 25 fractions to pelvis/cervix with concurrent chemo.  She then received brachytherapy 4 fractions of cervical tumor boost to 2800 cGy last treatment on 12/7/22 with Dr. Colmenares. She tolerated the treatment well.  She presents for 10 months follow up visit. PET/CT 10/03/23 showed Stable treated soft tissue in the left side the cervical stump with no abnormal activity. No suspicious FDG avid malignant disease in the pelvis or elsewhere. She sees Dr. Jensen for follow up. Patient was encouraged to d/c Premarin usage but reluctant, dose was lowered. She saw Dr. Matos 10/12/23- reimaging in 6 months recommended. She walks and does yoga. Screening colonoscopy scheduled later this year. Denies any urinary or bowel issues.    4/12/24 Patient presents for 4 months f/u. She was seen by Dr. Schmidt on 12/28/23. No clinical evidence of disease at the time of visit. Patient was encouraged to stop using Premarin. PET/CT 3/26/24 showed No suspicious FDG avid malignant lesion. She feels very well and denies any GI issues, urinary complaints, vaginal bleeding or discharge, change in appetite or pain. She continues FU with Carlo Bro and ,

## 2024-04-13 LAB
ALBUMIN SERPL ELPH-MCNC: 4.3 G/DL — SIGNIFICANT CHANGE UP (ref 3.3–5)
ALP SERPL-CCNC: 67 U/L — SIGNIFICANT CHANGE UP (ref 40–120)
ALT FLD-CCNC: 13 U/L — SIGNIFICANT CHANGE UP (ref 10–45)
ANION GAP SERPL CALC-SCNC: 12 MMOL/L — SIGNIFICANT CHANGE UP (ref 5–17)
AST SERPL-CCNC: 20 U/L — SIGNIFICANT CHANGE UP (ref 10–40)
BILIRUB SERPL-MCNC: 0.4 MG/DL — SIGNIFICANT CHANGE UP (ref 0.2–1.2)
BUN SERPL-MCNC: 21 MG/DL — SIGNIFICANT CHANGE UP (ref 7–23)
CALCIUM SERPL-MCNC: 9.4 MG/DL — SIGNIFICANT CHANGE UP (ref 8.4–10.5)
CHLORIDE SERPL-SCNC: 105 MMOL/L — SIGNIFICANT CHANGE UP (ref 96–108)
CO2 SERPL-SCNC: 23 MMOL/L — SIGNIFICANT CHANGE UP (ref 22–31)
CREAT SERPL-MCNC: 0.88 MG/DL — SIGNIFICANT CHANGE UP (ref 0.5–1.3)
EGFR: 68 ML/MIN/1.73M2 — SIGNIFICANT CHANGE UP
GLUCOSE SERPL-MCNC: 89 MG/DL — SIGNIFICANT CHANGE UP (ref 70–99)
MAGNESIUM SERPL-MCNC: 1.9 MG/DL — SIGNIFICANT CHANGE UP (ref 1.6–2.6)
POTASSIUM SERPL-MCNC: 4.3 MMOL/L — SIGNIFICANT CHANGE UP (ref 3.5–5.3)
POTASSIUM SERPL-SCNC: 4.3 MMOL/L — SIGNIFICANT CHANGE UP (ref 3.5–5.3)
PROT SERPL-MCNC: 6.2 G/DL — SIGNIFICANT CHANGE UP (ref 6–8.3)
SODIUM SERPL-SCNC: 140 MMOL/L — SIGNIFICANT CHANGE UP (ref 135–145)

## 2024-04-25 PROBLEM — C53.9 ADENOCARCINOMA OF CERVIX: Status: ACTIVE | Noted: 2022-09-23

## 2024-04-25 NOTE — RESULTS/DATA
[FreeTextEntry1] : 3/26/24 PET: No suspicious FDG avid malignant lesion.  6/1/23 PET: Overall stable soft tissue prominence in the left side of the cervical stump with smaller hypodense component and no focal abnormal activity that is concerning for locally recurrent FDG avid malignant process. No suspicious FDG avid lesion in the remaining field-of-view.  2/27/23 PET: Previously seen finding at the cervical stump appears somewhat smaller on the low-dose CT.   Areas of hypermetabolic activity seen there appear smaller as well, although remain quantitatively unchanged. Findings are consistent with response to therapy. Stable pulmonary findings without abnormal uptake.Otherwise, no new suspicious findings. REPRODUCTIVE ORGANS: Status-post DIANA/BSO. Previously seen activity at the cervical stump has decreased in area, although remains quantitatively stable with regards to maximum intensity. This now measures 4.8 x 3.7 cm (SUV 4.2, previously 5.5 x 4.0 cm, SUV 4.9).  9/12/22 MRI Pelvis: In a patient with positive biopsy of the cervical stump there appears be prominent cervix with cystic change without measurable mass. There is no evidence of extension into surrounding parametrium. No adenopathy can be seen.  9/1/22 Path: PAP: Cervical epithelial cell abnormality. Atypical glandular cells present, favor neoplastic.  8/24/22 PET: Hypermetabolic soft tissue mass arising from the LEFT portion of the vaginal cuff, consistent with malignancy. Nonspecific findings in the skeleton; there is an area of non--periarticular uptake within L1 where malignancy is not excluded. Remaining findings are indeterminate but are favored to be degenerative. Micronodularity along the LEFT oblique fissure, likely inflammatory. Addendum:  Patient was seen by the referring service on 8/30/2022. Patient was confirmed on physical examination to have a residual cervical stump after supracervical hysterectomy, clinically consistent with an endocervical tumor (?barrel cervix?) on exam, as well as being biopsy-proven. The hypermetabolic soft tissue mass described in the original report is therefore not arising from the vaginal cuff but in fact from the cervical stump. No other changes to original report.  8/8/22 Path: Cervical biopsy: Atypical endocervical epithelium. Squamous mucosa with reactive atypia. See part B.  Part B: Endocervical adenocarcinoma, well-differentiated. Tumor cells positive for P16 and Ki-67 was increased. Er and MA were negative.

## 2024-04-25 NOTE — PHYSICAL EXAM
[Fully active, able to carry on all pre-disease performance without restriction] : Status 0 - Fully active, able to carry on all pre-disease performance without restriction [Normal] : grossly intact [de-identified] : wt gain [de-identified] : fear of claustrophobia, asphyxiation, chronic

## 2024-04-25 NOTE — HISTORY OF PRESENT ILLNESS
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 She had a colposcopy on 8/8/22 and pathology showed atypical endocervical epithelium. Squamous mucosa with reactive atypia. See part B. Part B: Endocervical adenocarcinoma, well-differentiated. Tumor cells positive for P16 and Ki-67 was increased. Er and [place] were negative. She had a PET on 8/24/22 which showed hypermetabolic soft tissue mass arising from the LEFT portion of the vaginal cuff, consistent with malignancy. Nonspecific findings in the skeleton; there is an area of non--periarticular uptake within L1 where malignancy is not excluded. Remaining findings are indeterminate but are favored to be degenerative. Micronodularity along the LEFT oblique fissure, likely inflammatory.\par  Addendum: \par  Patient was seen by the referring service on 8/30/2022. Patient was confirmed on physical examination to have a residual cervical stump after supracervical hysterectomy, clinically consistent with an endocervical tumor (?barrel cervix?) on exam, as well as being biopsy-proven. The hypermetabolic soft tissue mass described in the original report is therefore not arising from the vaginal cuff but in fact from the cervical stump. No other changes to original report. \par  Consult slides from 9/1/22 showed a pathology of cervical epithelial cell abnormality. Atypical glandular cells present, favor neoplastic. On 9/12/22 an MRI pelvis showed in a patient with positive biopsy of the cervical stump there appears be prominent cervix with cystic change without measurable mass. There is no evidence of extension into surrounding parametrium. No adenopathy can be seen. [de-identified] : PMH: Bell's Palsy 2018 (resolved but has hyperacute hearing bilaterally); selective claustrophobia and phobia of asphyxiation which affects her ability to wear a standard mask. \par  PSH: tonsillectomy (age 19);  (); tubal ligation with TOP (); supracervical hysterectomy BSO for fibroids (); varicose vein ligation (); blepharoplasty (); right breast biopsy / FNA - benign (cannot recall year). \par  Family Hx: Mother breast ca, 66 yo; Father abdominal ca, 66 yo; Brother pancreatic ca, 67; maternal aunt breast ca; maternal cousin breast ca  28 yo \par  Social Hx: smoker for 15 years, quit at 30 yo; ETOH wine 2-3 oz once a day; retired, retail and real estate; , lives with her ; family close by  [de-identified] : She completed 6 cycles of Cisplatin on 11/2022 concurrent RT, completed 12/7/22. Ringing in ears / mild tinnitus continue due to bells palsy, chronic, stable.  She will be walking a 5K in May and continues yoga regularly. Seeing Dr Mahoney later today. She feels well today.   She is seeing GYN/ONC Dec 2023 and her GYN July 2023 (2024 annual is scheduled), part of [facility].  PCP annual visit was July 2023.

## 2024-04-25 NOTE — REVIEW OF SYSTEMS
[Recent Change In Weight] : ~T recent weight change [Diarrhea: Grade 0] : Diarrhea: Grade 0 [Anxiety] : anxiety [Negative] : Allergic/Immunologic [Fatigue] : no fatigue [Dysphagia] : no dysphagia [Odynophagia] : no odynophagia [Chest Pain] : no chest pain [Shortness Of Breath] : no shortness of breath [Abdominal Pain] : no abdominal pain [Vaginal Discharge] : no vaginal discharge [Dysmenorrhea/Abn Vaginal Bleeding] : no dysmenorrhea/abnormal vaginal bleeding [FreeTextEntry2] : wt gain [de-identified] : fear of asphyxiation

## 2024-07-23 ENCOUNTER — APPOINTMENT (OUTPATIENT)
Dept: GYNECOLOGIC ONCOLOGY | Facility: CLINIC | Age: 79
End: 2024-07-23
Payer: MEDICARE

## 2024-07-23 VITALS
WEIGHT: 149 LBS | BODY MASS INDEX: 25.44 KG/M2 | HEART RATE: 68 BPM | OXYGEN SATURATION: 98 % | DIASTOLIC BLOOD PRESSURE: 75 MMHG | SYSTOLIC BLOOD PRESSURE: 156 MMHG | HEIGHT: 64 IN

## 2024-07-23 DIAGNOSIS — C53.9 MALIGNANT NEOPLASM OF CERVIX UTERI, UNSPECIFIED: ICD-10-CM

## 2024-07-23 DIAGNOSIS — C53.8 MALIGNANT NEOPLASM OF OVERLAPPING SITES OF CERVIX UTERI: ICD-10-CM

## 2024-07-23 PROCEDURE — 99213 OFFICE O/P EST LOW 20 MIN: CPT

## 2024-07-23 PROCEDURE — 99459 PELVIC EXAMINATION: CPT

## 2024-07-24 LAB — HPV HIGH+LOW RISK DNA PNL CVX: NOT DETECTED

## 2024-08-01 LAB — CYTOLOGY CVX/VAG DOC THIN PREP: NORMAL

## 2024-08-01 NOTE — PHYSICAL EXAM
[Chaperone Present] : A chaperone was present in the examining room during all aspects of the physical examination [Absent] : Adnexa(ae): Absent [Abnormal] : Bimanual Exam: Abnormal [Normal] : Recto-Vaginal Exam: Normal [Fully active, able to carry on all pre-disease performance without restriction] : Status 0 - Fully active, able to carry on all pre-disease performance without restriction [02642] : A chaperone was present during the pelvic exam. [FreeTextEntry2] : [place] [de-identified] :  scar (Pfannenstiel) [de-identified] : there is no visible residual tumor and the cervix is mobile, with baseline shortening of the left parametrium, persistent tethering cervix to left with resultant loss of the left vaginal fornix on exam. Right parametrium is free. [de-identified] : RV septum free

## 2024-08-01 NOTE — LETTER BODY
[Dear  ___] : Dear  [unfilled], [I recently saw our patient [unfilled] for a follow-up visit.] : I recently saw our patient, [unfilled] for a follow-up visit. [Attached please find my note.] : Attached please find my note. [FreeTextEntry2] : She completed chemoradiation and will see me regularly for followup. I will perform all PAPs. She will continue to see you for routine GYN health maintenance. \par  I will keep you updated on her progress. \par  Thank you again for referring this lucretia patient.  [FreeTextEntry1] : pap/hpv

## 2024-08-01 NOTE — HISTORY OF PRESENT ILLNESS
[FreeTextEntry1] : Ms. Gonzalez was referred by Dr. Faulkner (PCP) and Dr. 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 She had an NITIN pap 8/3/21 and follow up colpo with biopsies was negative for dysplasia or cancer.   Follow up pap on 22 again noted NITIN, favoring neoplasia.   Colpo with biopsies 22 identified well differentiated endocervical adenocarcinoma on the ECC.  [facility] review: confirmatory of HPV-associated endocervical adenocarcinoma.  Initial GYN ONC consultation: 22  PMH:  Bell's Palsy 2018 (resolved but has hyperacute hearing bilaterally); selective claustrophobia and phobia of asphyxiation which affects her ability to wear a standard mask.   PSH:  tonsillectomy (age 19);  ();  tubal ligation with TOP (); supracervical hysterectomy BSO for fibroids (); varicose vein ligation ();  blepharoplasty (); right breast biopsy - benign (cannot recall year). ---------------------------- Pathology: 22 Colposcopy ([facility]) - task sent for [facility] path review 22 Cervix:  12 o'clock:  atypical endocervical epithelium, squamous mucosa with reactive atypia.   ECC:  endocervical adenocarcinoma, well differentiated.  Tumor cells positive for P16 and Ki-67 was increased.  ER and PA were negative.    Imagin22 PET CT ([facility]) Indication:  Cervical Cancer 1.  Hypermetabolic soft tissue mass arising from the LEFT portion of the vaginal cuff, consistent with malignancy. 2.  Nonspecific findings in the skeleton; there is an area of non--periarticular uptake within L1 where malignancy is not excluded. Remaining findings are indeterminate but are favored to be degenerative. MRI may be helpful. 3.  Micronodularity along the LEFT oblique fissure, likely inflammatory.  22: MRI report indicates no radiographic parametrial invasion, however this is present by definition based on the exam findings and the recommendation remains for chemoradiation  She completed external beam radiation therapy with Dr. Mahoney on 11/15/22 5000 cGy in 25 fractions to pelvis/cervix, and 6 cycles of concurrent Cisplatin. She then received brachytherapy 4 fractions of cervical tumor boost to 2800 cGy last treatment on 22 with Dr. Colmenares. She tolerated the treatment well.  23 PET/CT IMPRESSION: Since prior FDG-PET/CT scan 2022: 1.  Previously seen finding at the cervical stump appears somewhat smaller on the low-dose CT. Areas of hypermetabolic activity seen there appear smaller as well, although remain quantitatively unchanged. Findings are consistent with response to therapy. Attention to follow-up. 2.  Stable pulmonary findings without abnormal uptake. 3.  Otherwise, no new suspicious findings. *** ADDENDUM # 1 *** REPRODUCTIVE ORGANS: Status-post DIANA/BSO. Previously seen activity at the cervical stump has decreased in area, although remains quantitatively stable with regards to maximum intensity. Note that optimal evaluation of activity here is limited by proximity to the urinary bladder. When measured at the approximate same level as previously seen, this now measures 4.8 x 3.7 cm (SUV 4.2, image 234, previously 5.5 x 4.0 cm, SUV 4.9, prior image 223, remeasured).  3/13/23- PET/CT: overall stable soft tissue prominence in the left side of the cervix at the stump with smaller hypodense component and no focal abnormal activity that is concerning for locally recurrent FDG avid malignant process  10/2023- PET/CT 1. Stable treated soft tissue in the left side the cervical stump with no abnormal activity.  2. No suspicious FDG avid malignant disease in the pelvis or elsewhere.  3/2024 PET/CT REPRODUCTIVE ORGANS: Hysterectomy. Stable nonavid hypodense lesion surrounded by fibrotic and below in the left side off the cervical stump (images 230) 4.0 x 3.9 cm. No new suspicious FDG avid pelvic lesion. No suspicious FDG avid malignant lesion.  PAP 2023: scant cellularity; HRHPV (-) --------------------------------------------------- She tolerated chemoradiation treatment with tolerable side effects. Feels well overall.  Denies any VB or other associated signs or symptoms.   Health Maintenance: BMI: 21 COVID vaccine received:  Yes + booster Mammogram:   negative per patient ([facility]) Colonoscopy:  negative per patient; for future she is recommended to have Cologard instead.   GYN/Ref:  Ariadna Espinosa DO PCP:  Dr. Yahaira Faulkner GI:  Dr. Nathaniel Mahoney Rad Onc: Dr. Mahoney Med Onc: Dr. Matos

## 2024-08-01 NOTE — PHYSICAL EXAM
[Chaperone Present] : A chaperone was present in the examining room during all aspects of the physical examination [Absent] : Adnexa(ae): Absent [Abnormal] : Bimanual Exam: Abnormal Cheilitis Aggressive Treatment: I recommended application of Vaseline or Aquaphor numerous times a day (as often as every hour) and before going to bed. I also prescribed a topical steroid for twice daily use. [Normal] : Recto-Vaginal Exam: Normal [Fully active, able to carry on all pre-disease performance without restriction] : Status 0 - Fully active, able to carry on all pre-disease performance without restriction [21445] : A chaperone was present during the pelvic exam. [FreeTextEntry2] : Leesa [de-identified] :  scar (Pfannenstiel) [de-identified] : there is no visible residual tumor and the cervix is mobile, with baseline shortening of the left parametrium, persistent tethering cervix to left with resultant loss of the left vaginal fornix on exam. Right parametrium is free. [de-identified] : RV septum free

## 2024-08-01 NOTE — HISTORY OF PRESENT ILLNESS
[FreeTextEntry1] : Ms. Gonzalez was referred by Dr. Faulkner (PCP) and Dr. 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 She had an NITIN pap 8/3/21 and follow up colpo with biopsies was negative for dysplasia or cancer.   Follow up pap on 22 again noted NITIN, favoring neoplasia.   Colpo with biopsies 22 identified well differentiated endocervical adenocarcinoma on the ECC.  [facility] review: confirmatory of HPV-associated endocervical adenocarcinoma.  Initial GYN ONC consultation: 22  PMH:  Bell's Palsy 2018 (resolved but has hyperacute hearing bilaterally); selective claustrophobia and phobia of asphyxiation which affects her ability to wear a standard mask.   PSH:  tonsillectomy (age 19);  ();  tubal ligation with TOP (); supracervical hysterectomy BSO for fibroids (); varicose vein ligation ();  blepharoplasty (); right breast biopsy - benign (cannot recall year). ---------------------------- Pathology: 22 Colposcopy ([facility]) - task sent for [facility] path review 22 Cervix:  12 o'clock:  atypical endocervical epithelium, squamous mucosa with reactive atypia.   ECC:  endocervical adenocarcinoma, well differentiated.  Tumor cells positive for P16 and Ki-67 was increased.  ER and SC were negative.    Imagin22 PET CT ([facility]) Indication:  Cervical Cancer 1.  Hypermetabolic soft tissue mass arising from the LEFT portion of the vaginal cuff, consistent with malignancy. 2.  Nonspecific findings in the skeleton; there is an area of non--periarticular uptake within L1 where malignancy is not excluded. Remaining findings are indeterminate but are favored to be degenerative. MRI may be helpful. 3.  Micronodularity along the LEFT oblique fissure, likely inflammatory.  22: MRI report indicates no radiographic parametrial invasion, however this is present by definition based on the exam findings and the recommendation remains for chemoradiation  She completed external beam radiation therapy with Dr. Mahoney on 11/15/22 5000 cGy in 25 fractions to pelvis/cervix, and 6 cycles of concurrent Cisplatin. She then received brachytherapy 4 fractions of cervical tumor boost to 2800 cGy last treatment on 22 with Dr. Colmenares. She tolerated the treatment well.  23 PET/CT IMPRESSION: Since prior FDG-PET/CT scan 2022: 1.  Previously seen finding at the cervical stump appears somewhat smaller on the low-dose CT. Areas of hypermetabolic activity seen there appear smaller as well, although remain quantitatively unchanged. Findings are consistent with response to therapy. Attention to follow-up. 2.  Stable pulmonary findings without abnormal uptake. 3.  Otherwise, no new suspicious findings. *** ADDENDUM # 1 *** REPRODUCTIVE ORGANS: Status-post DIANA/BSO. Previously seen activity at the cervical stump has decreased in area, although remains quantitatively stable with regards to maximum intensity. Note that optimal evaluation of activity here is limited by proximity to the urinary bladder. When measured at the approximate same level as previously seen, this now measures 4.8 x 3.7 cm (SUV 4.2, image 234, previously 5.5 x 4.0 cm, SUV 4.9, prior image 223, remeasured).  3/13/23- PET/CT: overall stable soft tissue prominence in the left side of the cervix at the stump with smaller hypodense component and no focal abnormal activity that is concerning for locally recurrent FDG avid malignant process  10/2023- PET/CT 1. Stable treated soft tissue in the left side the cervical stump with no abnormal activity.  2. No suspicious FDG avid malignant disease in the pelvis or elsewhere.  3/2024 PET/CT REPRODUCTIVE ORGANS: Hysterectomy. Stable nonavid hypodense lesion surrounded by fibrotic and below in the left side off the cervical stump (images 230) 4.0 x 3.9 cm. No new suspicious FDG avid pelvic lesion. No suspicious FDG avid malignant lesion.  PAP 2023: scant cellularity; HRHPV (-) --------------------------------------------------- She tolerated chemoradiation treatment with tolerable side effects. Feels well overall.  Denies any VB or other associated signs or symptoms.   Health Maintenance: BMI: 21 COVID vaccine received:  Yes + booster Mammogram:   negative per patient ([facility]) Colonoscopy:  negative per patient; for future she is recommended to have Cologard instead.   GYN/Ref:  Ariadna Espinosa DO PCP:  Dr. Yahaira Faulkner GI:  Dr. Nathaniel Mahoney Rad Onc: Dr. Mahoney Med Onc: Dr. Matos

## 2024-08-01 NOTE — DISCUSSION/SUMMARY
[Reviewed Clinical Lab Test(s)] : Results of clinical tests were reviewed. [Reviewed Radiology Report(s)] : Radiology reports were reviewed. [Reviewed Radiology Film/Image(s)] : Images from radiology studies were reviewed and examined. [FreeTextEntry1] : - f/u PAP/HPV (addendum: PAP negative; HRHPV(-); Patient notified via RN tasklist. LDS) - PET/CT is planned for Dec/Jan - exam findings discussed - continue dilator use  - risk and s/sx of recurrent disease discussed - f/u with me in January - I have encouraged her to try to discontinue her persistent premarin use. - all questions were answered to her apparent satisfaction.

## 2024-08-03 PROBLEM — Z86.69 HISTORY OF BELL'S PALSY: Status: RESOLVED | Noted: 2021-07-18 | Resolved: 2024-08-03

## 2024-08-04 PROBLEM — Z13.820 OSTEOPOROSIS SCREENING: Status: ACTIVE | Noted: 2024-08-04

## 2024-08-11 PROBLEM — E78.5 HLD (HYPERLIPIDEMIA): Status: ACTIVE | Noted: 2024-08-11

## 2024-08-12 ENCOUNTER — NON-APPOINTMENT (OUTPATIENT)
Age: 79
End: 2024-08-12

## 2024-08-12 ENCOUNTER — APPOINTMENT (OUTPATIENT)
Dept: FAMILY MEDICINE | Facility: CLINIC | Age: 79
End: 2024-08-12
Payer: MEDICARE

## 2024-08-12 ENCOUNTER — LABORATORY RESULT (OUTPATIENT)
Age: 79
End: 2024-08-12

## 2024-08-12 VITALS
BODY MASS INDEX: 25.61 KG/M2 | HEIGHT: 64 IN | SYSTOLIC BLOOD PRESSURE: 122 MMHG | TEMPERATURE: 97.8 F | DIASTOLIC BLOOD PRESSURE: 80 MMHG | WEIGHT: 150 LBS | OXYGEN SATURATION: 96 % | HEART RATE: 71 BPM

## 2024-08-12 DIAGNOSIS — C53.9 MALIGNANT NEOPLASM OF CERVIX UTERI, UNSPECIFIED: ICD-10-CM

## 2024-08-12 DIAGNOSIS — E03.8 OTHER SPECIFIED HYPOTHYROIDISM: ICD-10-CM

## 2024-08-12 DIAGNOSIS — R73.01 IMPAIRED FASTING GLUCOSE: ICD-10-CM

## 2024-08-12 DIAGNOSIS — M15.9 POLYOSTEOARTHRITIS, UNSPECIFIED: ICD-10-CM

## 2024-08-12 DIAGNOSIS — E78.5 HYPERLIPIDEMIA, UNSPECIFIED: ICD-10-CM

## 2024-08-12 DIAGNOSIS — Z00.00 ENCOUNTER FOR GENERAL ADULT MEDICAL EXAMINATION W/OUT ABNORMAL FINDINGS: ICD-10-CM

## 2024-08-12 PROCEDURE — 36415 COLL VENOUS BLD VENIPUNCTURE: CPT

## 2024-08-12 PROCEDURE — G0439: CPT

## 2024-08-12 PROCEDURE — 93000 ELECTROCARDIOGRAM COMPLETE: CPT

## 2024-08-12 NOTE — REVIEW OF SYSTEMS
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 Buitrago's office in past year or two and was fine and is thinking of repeating eval  [FreeTextEntry7] : no true diarrhea but has incr freq of BMs and sometimes they are formed/solid and sometimes on softer/looser side, since had RT, UTD on colonoscopy (5/2024) which showed evidence of radiation proctitis  [FreeTextEntry9] : usual joint pain/stiffness due to OA that is manageable with her regular exercise,

## 2024-08-12 NOTE — HISTORY OF PRESENT ILLNESS
[FreeTextEntry1] : MALISSA MURCIA is a 78 year old female here for a physical exam. [de-identified] : Her last physical exam was last year  Vaccines: Tetanus is NOT up to date, Tdap 2008 Pneumococcal vaccination is NOT up to date Shingrix is up to date, had one dose 7/2022 and had second dose in 12/2022 or 1/2023 and will try to clarify date  Her last dentist visit was less than one year ago Her last eye doctor appointment was less than one year ago Her last dermatologist visit was less than one year ago  GYN visit is up to date, 8/2023 Dr. Espinosa, 7/2024 Dr. Schmidt for f/u cervical cancer dx 8/2022. Has appt to see Dr. Baltazar Judd 8/12/24  Mammogram is up to date, 8/2024  stable/benign and has appt 9/2024  Colon cancer screening is up to date, colonoscopy 5/2024, single angiodysplasia in cecum, +radiation proctitis, technically very difficult procedure due to fixed/strictured sigmoid colon, f/u colonoscopy prn at this point in her life, Dr. Mahoney DEXA is up to date she thinks and she will check with gyn at her visit today re timing of next DEXA  Her diet is healthy overall. Has modified diet to be lower in lactose since had radiation therapy as has had more GI sxs since RT incl normal to looser stools and rectal urgency and more freq BMs but is manageable   Exercise: walking, yoga, also does multiple flights of stairs in her home daily   Amy has h/o subclinical hypothyroidism, mild high cholesterol, OA, IFG, HSV 2, h/o Stage IIB cervical cancer (Dx 8/2022).   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 Espinosa in 8/2022. She is s/p RT and chemotherapy. She is UTD on followup and surveillance imaging with specialists including Dr. Schmidt (gyn onc surg), Dr. Matos (H/O), and Dr. RAZ Mahoney (rad onc). PET scan 3/2024 showed stable/findings, and no evidence of disease recurrence  Her tumor is ER negative but Dr. Schmidt recommended she d/c her HT. I have also d/w her in the past and again revd today that risks of continued HT use at this stage of her life likely outweigh benefits and hannah in light of gyn malignancy. I again strongly recommend she d/c HT at this point in her life. She is still very hesitant to do so as has had severe hot flashes every time she has tried to d/c med in past and even gets occas hot flash while on HT.  At her Summer 2023 PE visit TFTs were in overt hypothyroidism range and she is usually in subclinical hypothyroidism range. Trial levothyroxine 25 mcg was offered vs monitor as in the past when labs in this range they tend to shift back to subclinical hypothyroidism range spontaneously for Amy. Amy opted to defer on thyroid med trial as feels well without s/sxs hypothyroidism and prefers to minimize meds. Will recheck labs today.   Also at Summer 2023 visit , , HDL 52 (46),  (117). 10 yr ASCVD risk est around 18% (mainly due to her age) so technically could consider statin therapy. She prefers to defer on statin med and has continued to work on clean eating and regular exercise. We will rpt labs today.  We disc option to try psyllium husk fiber supplement as can help with her GI sxs as well as lower LDL

## 2024-08-12 NOTE — PLAN
[FreeTextEntry1] : Continue all medications as prescribed. Check labs as above (defer on CBC as monitored by H/O team and was wnl 4/2024). Will adjust any medications based upon lab results.  Reviewed age-appropriate preventive screening tests with patient. UTD on gyn exams, gyn onc exams, mammogram, DEXA and colonoscopy screening. Has gyn and mammo scheduled for within next month and will ask that I get a copy of mammo results  Revd/recommended Tdap booster, PCV 20 dose.   ECG NSR, nl ECG  LDL goal <=100 ideally. Reviewed LDL goal and ASCVD risk estimate and factors that go into this calculation.  Reviewed risks/benefits of statin med. Reviewed red yeast rice RYR (600-1200 mg daily) risks/benefits as an alternative to statin meds if not ready to consider statin meds. Recommended excellent hydration +/- co Q10 (100-400 mg daily) to decrease risk for statin (or RYR) related myalgias.   Revd labs/ASCVD risk estimate levels. Technically is reasonable to consider adding statin med at this point in her life She strongly prefers to defer on meds. Revd option to consider seeing cardiologist for eval/testing and/or doing coronary calcium CT test to get more info to help her make a more informed decision re statin med or not. She defers on card eval/CT for now but will let me know if ever changes her mind. Consider psyllium husk fiber supplementation.   Reviewed diagnosis of impaired fasting glucose (pre-diabetes) and risk for progression to diabetes. Reviewed dietary/lifestyle measures that can help to improve glucose and A1C levels over time and decrease risk for progression to diabetes, including eating cleanly (eg Mediterranean style eating plan), limiting/avoiding white flour carbohydrates and added sugars/sweeteners, pairing proteins with carbohydrates, higher fiber intake in diet, exercising regularly including cardio and strength training, achieving and maintaining a normal/near normal weight/BMI etc. We will monitor glucose and HgbA1C trends over time  Discussed clean eating (eg Mediterranean style plant based eating plan) and regular exercise/staying as physically active as possible.  Include balance exercises and strength training and core strengthening exercises for bone health and to decrease risk for falls.  I again revd r/b/se continued HT (incl incr risk for malignancy) use and asked Amy to d/w gyn and gyn onc re d/c HT (see HPI for addtl info). Revd that I remain concerned about risks associated with long term HT use at this point in her life.  Recommended Tylenol XS or Arthritis 1-2 pills BID-TID if helpful, ok to use NSAIDs sparingly with food (but revd r/b/se of NSAIDs incl CV, renal and GI and she should limit use as much as possible), regular stretching, heat/ice prn, consider turmeric supplementation, consider CBD cream or oral options, gentle yoga/chair yoga, Pilates, strengthen core muscles, consider chiro and/or massage and/or acupuncture. If these measures are not helpful enough then consider consultation with ortho and/or pain  for further eval and treatment.  Reviewed importance of good self care (e.g. meditation, yoga, adequate rest, regular exercise, magnesium, clean eating, etc.).  Follow up with specialists as recommended by them.   Follow up for next physical in one year.

## 2024-08-12 NOTE — HEALTH RISK ASSESSMENT
[No falls in past year] : Patient reported no falls in the past year [0] : 2) Feeling down, depressed, or hopeless: Not at all (0) [PHQ-2 Negative - No further assessment needed] : PHQ-2 Negative - No further assessment needed [Former] : Former [20 or more] : 20 or more [> 15 Years] : > 15 Years [[id number]] : 0

## 2024-08-12 NOTE — ASSESSMENT
[FreeTextEntry1] : MALISSA MURCIA is a 78 year old female here for a physical exam.  She is also here to follow up on medical issues as noted above.  Amy has h/o subclinical hypothyroidism, OA, IFG, HSV 2, h/o Stage IIB cervical cancer (Dx 8/2022).

## 2024-08-12 NOTE — PHYSICAL EXAM
[No Acute Distress] : no acute distress [Well Nourished] : well nourished [Well Developed] : well developed [Well-Appearing] : well-appearing [Normal Sclera/Conjunctiva] : normal sclera/conjunctiva [EOMI] : extraocular movements intact [Normal Outer Ear/Nose] : the outer ears and nose were normal in appearance [Normal Oropharynx] : the oropharynx was normal [No Lymphadenopathy] : no lymphadenopathy [Supple] : supple [Thyroid Normal, No Nodules] : the thyroid was normal and there were no nodules present [No Respiratory Distress] : no respiratory distress  [No Accessory Muscle Use] : no accessory muscle use [Clear to Auscultation] : lungs were clear to auscultation bilaterally [Normal Rate] : normal rate  [Regular Rhythm] : with a regular rhythm [Normal S1, S2] : normal S1 and S2 [No Murmur] : no murmur heard [No Carotid Bruits] : no carotid bruits [No Abdominal Bruit] : a ~M bruit was not heard ~T in the abdomen [No Varicosities] : no varicosities [Pedal Pulses Present] : the pedal pulses are present [No Edema] : there was no peripheral edema [No Palpable Aorta] : no palpable aorta [No Extremity Clubbing/Cyanosis] : no extremity clubbing/cyanosis [Soft] : abdomen soft [Non Tender] : non-tender [Non-distended] : non-distended [No Masses] : no abdominal mass palpated [No HSM] : no HSM [Normal Bowel Sounds] : normal bowel sounds [Normal Posterior Cervical Nodes] : no posterior cervical lymphadenopathy [Normal Anterior Cervical Nodes] : no anterior cervical lymphadenopathy [No Joint Swelling] : no joint swelling [No Rash] : no rash [Coordination Grossly Intact] : coordination grossly intact [No Focal Deficits] : no focal deficits [Normal Gait] : normal gait [Normal Affect] : the affect was normal [Normal Mood] : the mood was normal [Normal Insight/Judgement] : insight and judgment were intact [de-identified] : no s/sxs acute synovitis, L patella with new bony prominence from fall a few months ago (revd this is likely chronic) but no edema [de-identified] : +tanned skin with solar lentigos and freckles

## 2024-08-12 NOTE — HEALTH RISK ASSESSMENT
[No falls in past year] : Patient reported no falls in the past year [0] : 2) Feeling down, depressed, or hopeless: Not at all (0) [PHQ-2 Negative - No further assessment needed] : PHQ-2 Negative - No further assessment needed [Former] : Former [20 or more] : 20 or more [> 15 Years] : > 15 Years [[id number]] : 0

## 2024-08-12 NOTE — PHYSICAL EXAM
[No Acute Distress] : no acute distress [Well Nourished] : well nourished [Well Developed] : well developed [Well-Appearing] : well-appearing [Normal Sclera/Conjunctiva] : normal sclera/conjunctiva [EOMI] : extraocular movements intact [Normal Outer Ear/Nose] : the outer ears and nose were normal in appearance [Normal Oropharynx] : the oropharynx was normal [No Lymphadenopathy] : no lymphadenopathy [Supple] : supple [Thyroid Normal, No Nodules] : the thyroid was normal and there were no nodules present [No Respiratory Distress] : no respiratory distress  [No Accessory Muscle Use] : no accessory muscle use [Clear to Auscultation] : lungs were clear to auscultation bilaterally [Normal Rate] : normal rate  [Regular Rhythm] : with a regular rhythm [Normal S1, S2] : normal S1 and S2 [No Murmur] : no murmur heard [No Carotid Bruits] : no carotid bruits [No Varicosities] : no varicosities [No Abdominal Bruit] : a ~M bruit was not heard ~T in the abdomen [Pedal Pulses Present] : the pedal pulses are present [No Edema] : there was no peripheral edema [No Palpable Aorta] : no palpable aorta [No Extremity Clubbing/Cyanosis] : no extremity clubbing/cyanosis [Soft] : abdomen soft [Non Tender] : non-tender [Non-distended] : non-distended [No Masses] : no abdominal mass palpated [No HSM] : no HSM [Normal Bowel Sounds] : normal bowel sounds [Normal Posterior Cervical Nodes] : no posterior cervical lymphadenopathy [Normal Anterior Cervical Nodes] : no anterior cervical lymphadenopathy [No Joint Swelling] : no joint swelling [No Rash] : no rash [Coordination Grossly Intact] : coordination grossly intact [No Focal Deficits] : no focal deficits [Normal Gait] : normal gait [Normal Affect] : the affect was normal [Normal Mood] : the mood was normal [Normal Insight/Judgement] : insight and judgment were intact [de-identified] : no s/sxs acute synovitis, L patella with new bony prominence from fall a few months ago (revd this is likely chronic) but no edema [de-identified] : +tanned skin with solar lentigos and freckles

## 2024-08-12 NOTE — HISTORY OF PRESENT ILLNESS
[FreeTextEntry1] : MALISSA MURCIA is a 78 year old female here for a physical exam. [de-identified] : Her last physical exam was last year  Vaccines: Tetanus is NOT up to date, Tdap 2008 Pneumococcal vaccination is NOT up to date Shingrix is up to date, had one dose 7/2022 and had second dose in 12/2022 or 1/2023 and will try to clarify date  Her last dentist visit was less than one year ago Her last eye doctor appointment was less than one year ago Her last dermatologist visit was less than one year ago  GYN visit is up to date, 8/2023 Dr. Espinosa, 7/2024 Dr. Schmidt for f/u cervical cancer dx 8/2022. Has appt to see Dr. Baltazar Judd 8/12/24  Mammogram is up to date, 8/2024  stable/benign and has appt 9/2024  Colon cancer screening is up to date, colonoscopy 5/2024, single angiodysplasia in cecum, +radiation proctitis, technically very difficult procedure due to fixed/strictured sigmoid colon, f/u colonoscopy prn at this point in her life, Dr. Mahoney DEXA is up to date she thinks and she will check with gyn at her visit today re timing of next DEXA  Her diet is healthy overall. Has modified diet to be lower in lactose since had radiation therapy as has had more GI sxs since RT incl normal to looser stools and rectal urgency and more freq BMs but is manageable   Exercise: walking, yoga, also does multiple flights of stairs in her home daily   Amy has h/o subclinical hypothyroidism, mild high cholesterol, OA, IFG, HSV 2, h/o Stage IIB cervical cancer (Dx 8/2022).   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 Espinosa in 8/2022. She is s/p RT and chemotherapy. She is UTD on followup and surveillance imaging with specialists including Dr. Schmidt (gyn onc surg), Dr. Matos (H/O), and Dr. RAZ Mahoney (rad onc). PET scan 3/2024 showed stable/findings, and no evidence of disease recurrence  Her tumor is ER negative but Dr. Schmidt recommended she d/c her HT. I have also d/w her in the past and again revd today that risks of continued HT use at this stage of her life likely outweigh benefits and hannah in light of gyn malignancy. I again strongly recommend she d/c HT at this point in her life. She is still very hesitant to do so as has had severe hot flashes every time she has tried to d/c med in past and even gets occas hot flash while on HT.  At her Summer 2023 PE visit TFTs were in overt hypothyroidism range and she is usually in subclinical hypothyroidism range. Trial levothyroxine 25 mcg was offered vs monitor as in the past when labs in this range they tend to shift back to subclinical hypothyroidism range spontaneously for Amy. Amy opted to defer on thyroid med trial as feels well without s/sxs hypothyroidism and prefers to minimize meds. Will recheck labs today.   Also at Summer 2023 visit , , HDL 52 (46),  (117). 10 yr ASCVD risk est around 18% (mainly due to her age) so technically could consider statin therapy. She prefers to defer on statin med and has continued to work on clean eating and regular exercise. We will rpt labs today.  We disc option to try psyllium husk fiber supplement as can help with her GI sxs as well as lower LDL

## 2024-09-23 PROBLEM — M85.80 OSTEOPENIA, UNSPECIFIED LOCATION: Status: ACTIVE | Noted: 2024-09-23

## 2024-10-21 ENCOUNTER — OFFICE (OUTPATIENT)
Dept: URBAN - METROPOLITAN AREA CLINIC 102 | Facility: CLINIC | Age: 79
Setting detail: OPHTHALMOLOGY
End: 2024-10-21
Payer: MEDICARE

## 2024-10-21 DIAGNOSIS — G51.0: ICD-10-CM

## 2024-10-21 DIAGNOSIS — H16.223: ICD-10-CM

## 2024-10-21 DIAGNOSIS — H25.13: ICD-10-CM

## 2024-10-21 DIAGNOSIS — H40.033: ICD-10-CM

## 2024-10-21 DIAGNOSIS — H02.015: ICD-10-CM

## 2024-10-21 PROCEDURE — 67820 REVISE EYELASHES: CPT | Mod: LT | Performed by: OPHTHALMOLOGY

## 2024-10-21 PROCEDURE — 92133 CPTRZD OPH DX IMG PST SGM ON: CPT | Performed by: OPHTHALMOLOGY

## 2024-10-21 PROCEDURE — 92020 GONIOSCOPY: CPT | Performed by: OPHTHALMOLOGY

## 2024-10-21 PROCEDURE — 92014 COMPRE OPH EXAM EST PT 1/>: CPT | Mod: 25 | Performed by: OPHTHALMOLOGY

## 2024-10-21 ASSESSMENT — REFRACTION_CURRENTRX
OS_OVR_VA: 20/
OD_OVR_VA: 20/
OS_VPRISM_DIRECTION: SV
OD_CYLINDER: -3.00
OS_SPHERE: +2.25
OS_AXIS: 95
OD_SPHERE: +2.25
OS_CYLINDER: -4.00
OD_AXIS: 104
OD_VPRISM_DIRECTION: SV

## 2024-10-21 ASSESSMENT — VISUAL ACUITY
OS_BCVA: 20/25-2
OD_BCVA: 20/25-1

## 2024-10-21 ASSESSMENT — TONOMETRY
OS_IOP_MMHG: 15
OD_IOP_MMHG: 12

## 2024-10-21 ASSESSMENT — CONFRONTATIONAL VISUAL FIELD TEST (CVF)
OD_FINDINGS: FULL
OS_FINDINGS: FULL

## 2024-10-21 ASSESSMENT — KERATOMETRY
OD_AXISANGLE_DEGREES: 010
OS_K1POWER_DIOPTERS: 44.75
METHOD_AUTO_MANUAL: AUTO
OD_K1POWER_DIOPTERS: 44.00
OD_K2POWER_DIOPTERS: 47.00
OS_K2POWER_DIOPTERS: 47.25
OS_AXISANGLE_DEGREES: 013

## 2024-10-21 ASSESSMENT — LID EXAM ASSESSMENTS: OS_TRICHIASIS: LLL 1+

## 2024-10-21 ASSESSMENT — REFRACTION_AUTOREFRACTION
OD_AXIS: 102
OD_SPHERE: +3.25
OS_SPHERE: +1.75
OS_CYLINDER: -3.25
OD_CYLINDER: -4.00
OS_AXIS: 092

## 2025-01-14 ENCOUNTER — APPOINTMENT (OUTPATIENT)
Dept: NUCLEAR MEDICINE | Facility: CLINIC | Age: 80
End: 2025-01-14

## 2025-01-14 ENCOUNTER — OUTPATIENT (OUTPATIENT)
Dept: OUTPATIENT SERVICES | Facility: HOSPITAL | Age: 80
LOS: 1 days | End: 2025-01-14
Payer: MEDICARE

## 2025-01-14 DIAGNOSIS — C53.9 MALIGNANT NEOPLASM OF CERVIX UTERI, UNSPECIFIED: ICD-10-CM

## 2025-01-14 DIAGNOSIS — Z98.51 TUBAL LIGATION STATUS: Chronic | ICD-10-CM

## 2025-01-14 DIAGNOSIS — Z98.890 OTHER SPECIFIED POSTPROCEDURAL STATES: Chronic | ICD-10-CM

## 2025-01-14 DIAGNOSIS — Z90.710 ACQUIRED ABSENCE OF BOTH CERVIX AND UTERUS: Chronic | ICD-10-CM

## 2025-01-14 DIAGNOSIS — Z98.891 HISTORY OF UTERINE SCAR FROM PREVIOUS SURGERY: Chronic | ICD-10-CM

## 2025-01-14 PROCEDURE — A9552: CPT

## 2025-01-14 PROCEDURE — 78815 PET IMAGE W/CT SKULL-THIGH: CPT | Mod: 26,PS,KX

## 2025-01-14 PROCEDURE — 78815 PET IMAGE W/CT SKULL-THIGH: CPT

## 2025-01-17 ENCOUNTER — OUTPATIENT (OUTPATIENT)
Dept: OUTPATIENT SERVICES | Facility: HOSPITAL | Age: 80
LOS: 1 days | Discharge: HOME | End: 2025-01-17

## 2025-01-17 DIAGNOSIS — Z98.51 TUBAL LIGATION STATUS: Chronic | ICD-10-CM

## 2025-01-17 DIAGNOSIS — Z90.710 ACQUIRED ABSENCE OF BOTH CERVIX AND UTERUS: Chronic | ICD-10-CM

## 2025-01-17 DIAGNOSIS — Z98.891 HISTORY OF UTERINE SCAR FROM PREVIOUS SURGERY: Chronic | ICD-10-CM

## 2025-01-17 DIAGNOSIS — C53.9 MALIGNANT NEOPLASM OF CERVIX UTERI, UNSPECIFIED: ICD-10-CM

## 2025-01-17 DIAGNOSIS — Z98.890 OTHER SPECIFIED POSTPROCEDURAL STATES: Chronic | ICD-10-CM

## 2025-01-27 ENCOUNTER — RESULT REVIEW (OUTPATIENT)
Age: 80
End: 2025-01-27

## 2025-01-27 ENCOUNTER — NON-APPOINTMENT (OUTPATIENT)
Age: 80
End: 2025-01-27

## 2025-01-27 ENCOUNTER — APPOINTMENT (OUTPATIENT)
Dept: HEMATOLOGY ONCOLOGY | Facility: CLINIC | Age: 80
End: 2025-01-27
Payer: MEDICARE

## 2025-01-27 VITALS
SYSTOLIC BLOOD PRESSURE: 122 MMHG | WEIGHT: 152 LBS | HEIGHT: 64 IN | OXYGEN SATURATION: 96 % | HEART RATE: 67 BPM | BODY MASS INDEX: 25.95 KG/M2 | TEMPERATURE: 98 F | DIASTOLIC BLOOD PRESSURE: 78 MMHG

## 2025-01-27 DIAGNOSIS — N39.0 URINARY TRACT INFECTION, SITE NOT SPECIFIED: ICD-10-CM

## 2025-01-27 LAB
BASOPHILS # BLD AUTO: 0.02 K/UL — SIGNIFICANT CHANGE UP (ref 0–0.2)
BASOPHILS NFR BLD AUTO: 0.5 % — SIGNIFICANT CHANGE UP (ref 0–2)
EOSINOPHIL # BLD AUTO: 0.21 K/UL — SIGNIFICANT CHANGE UP (ref 0–0.5)
EOSINOPHIL NFR BLD AUTO: 5.1 % — SIGNIFICANT CHANGE UP (ref 0–6)
HCT VFR BLD CALC: 40.2 % — SIGNIFICANT CHANGE UP (ref 34.5–45)
HGB BLD-MCNC: 13.4 G/DL — SIGNIFICANT CHANGE UP (ref 11.5–15.5)
IMM GRANULOCYTES NFR BLD AUTO: 0.2 % — SIGNIFICANT CHANGE UP (ref 0–0.9)
LYMPHOCYTES # BLD AUTO: 0.79 K/UL — LOW (ref 1–3.3)
LYMPHOCYTES # BLD AUTO: 19.1 % — SIGNIFICANT CHANGE UP (ref 13–44)
MCHC RBC-ENTMCNC: 29.3 PG — SIGNIFICANT CHANGE UP (ref 27–34)
MCHC RBC-ENTMCNC: 33.3 G/DL — SIGNIFICANT CHANGE UP (ref 32–36)
MCV RBC AUTO: 87.8 FL — SIGNIFICANT CHANGE UP (ref 80–100)
MONOCYTES # BLD AUTO: 0.39 K/UL — SIGNIFICANT CHANGE UP (ref 0–0.9)
MONOCYTES NFR BLD AUTO: 9.4 % — SIGNIFICANT CHANGE UP (ref 2–14)
NEUTROPHILS # BLD AUTO: 2.72 K/UL — SIGNIFICANT CHANGE UP (ref 1.8–7.4)
NEUTROPHILS NFR BLD AUTO: 65.7 % — SIGNIFICANT CHANGE UP (ref 43–77)
NRBC # BLD: 0 /100 WBCS — SIGNIFICANT CHANGE UP (ref 0–0)
NRBC BLD-RTO: 0 /100 WBCS — SIGNIFICANT CHANGE UP (ref 0–0)
PLATELET # BLD AUTO: 136 K/UL — LOW (ref 150–400)
RBC # BLD: 4.58 M/UL — SIGNIFICANT CHANGE UP (ref 3.8–5.2)
RBC # FLD: 13.4 % — SIGNIFICANT CHANGE UP (ref 10.3–14.5)
WBC # BLD: 4.14 K/UL — SIGNIFICANT CHANGE UP (ref 3.8–10.5)
WBC # FLD AUTO: 4.14 K/UL — SIGNIFICANT CHANGE UP (ref 3.8–10.5)

## 2025-01-27 PROCEDURE — G2211 COMPLEX E/M VISIT ADD ON: CPT

## 2025-01-27 PROCEDURE — 99215 OFFICE O/P EST HI 40 MIN: CPT

## 2025-01-28 ENCOUNTER — APPOINTMENT (OUTPATIENT)
Dept: GYNECOLOGIC ONCOLOGY | Facility: CLINIC | Age: 80
End: 2025-01-28
Payer: MEDICARE

## 2025-01-28 VITALS
BODY MASS INDEX: 25.1 KG/M2 | RESPIRATION RATE: 18 BRPM | SYSTOLIC BLOOD PRESSURE: 140 MMHG | HEART RATE: 77 BPM | OXYGEN SATURATION: 98 % | DIASTOLIC BLOOD PRESSURE: 84 MMHG | HEIGHT: 64 IN | WEIGHT: 147 LBS

## 2025-01-28 DIAGNOSIS — C53.9 MALIGNANT NEOPLASM OF CERVIX UTERI, UNSPECIFIED: ICD-10-CM

## 2025-01-28 DIAGNOSIS — C53.8 MALIGNANT NEOPLASM OF OVERLAPPING SITES OF CERVIX UTERI: ICD-10-CM

## 2025-01-28 LAB
ALBUMIN SERPL ELPH-MCNC: 4.2 G/DL
ALP BLD-CCNC: 77 U/L
ALT SERPL-CCNC: 14 U/L
ANION GAP SERPL CALC-SCNC: 13 MMOL/L
AST SERPL-CCNC: 20 U/L
BILIRUB SERPL-MCNC: 0.4 MG/DL
BUN SERPL-MCNC: 23 MG/DL
CALCIUM SERPL-MCNC: 9.3 MG/DL
CHLORIDE SERPL-SCNC: 104 MMOL/L
CO2 SERPL-SCNC: 23 MMOL/L
CREAT SERPL-MCNC: 0.93 MG/DL
EGFR: 63 ML/MIN/1.73M2
GLUCOSE SERPL-MCNC: 86 MG/DL
MAGNESIUM SERPL-MCNC: 2.1 MG/DL
POTASSIUM SERPL-SCNC: 4 MMOL/L
PROT SERPL-MCNC: 6.5 G/DL
SODIUM SERPL-SCNC: 140 MMOL/L

## 2025-01-28 PROCEDURE — 99459 PELVIC EXAMINATION: CPT

## 2025-01-28 PROCEDURE — 99213 OFFICE O/P EST LOW 20 MIN: CPT

## 2025-01-30 LAB — HPV HIGH+LOW RISK DNA PNL CVX: NOT DETECTED

## 2025-02-01 LAB — CYTOLOGY CVX/VAG DOC THIN PREP: ABNORMAL

## 2025-08-05 ENCOUNTER — APPOINTMENT (OUTPATIENT)
Dept: GYNECOLOGIC ONCOLOGY | Facility: CLINIC | Age: 80
End: 2025-08-05

## 2025-08-05 VITALS
WEIGHT: 150 LBS | HEIGHT: 64 IN | HEART RATE: 74 BPM | SYSTOLIC BLOOD PRESSURE: 145 MMHG | DIASTOLIC BLOOD PRESSURE: 82 MMHG | BODY MASS INDEX: 25.61 KG/M2 | OXYGEN SATURATION: 99 % | RESPIRATION RATE: 17 BRPM | TEMPERATURE: 98 F

## 2025-08-05 DIAGNOSIS — C53.8 MALIGNANT NEOPLASM OF OVERLAPPING SITES OF CERVIX UTERI: ICD-10-CM

## 2025-08-05 PROCEDURE — 99459 PELVIC EXAMINATION: CPT

## 2025-08-05 PROCEDURE — 99213 OFFICE O/P EST LOW 20 MIN: CPT

## 2025-08-07 PROBLEM — Z13.6 ENCOUNTER FOR SCREENING FOR CARDIOVASCULAR DISORDERS: Status: ACTIVE | Noted: 2025-08-07

## 2025-08-07 LAB — HPV HIGH+LOW RISK DNA PNL CVX: NOT DETECTED

## 2025-08-08 ENCOUNTER — LABORATORY RESULT (OUTPATIENT)
Age: 80
End: 2025-08-08

## 2025-08-08 ENCOUNTER — APPOINTMENT (OUTPATIENT)
Dept: FAMILY MEDICINE | Facility: CLINIC | Age: 80
End: 2025-08-08
Payer: MEDICARE

## 2025-08-08 VITALS
WEIGHT: 153 LBS | HEIGHT: 64 IN | SYSTOLIC BLOOD PRESSURE: 126 MMHG | HEART RATE: 60 BPM | DIASTOLIC BLOOD PRESSURE: 72 MMHG | BODY MASS INDEX: 26.12 KG/M2 | TEMPERATURE: 97.2 F | OXYGEN SATURATION: 98 %

## 2025-08-08 DIAGNOSIS — M85.80 OTHER SPECIFIED DISORDERS OF BONE DENSITY AND STRUCTURE, UNSPECIFIED SITE: ICD-10-CM

## 2025-08-08 DIAGNOSIS — Z13.6 ENCOUNTER FOR SCREENING FOR CARDIOVASCULAR DISORDERS: ICD-10-CM

## 2025-08-08 DIAGNOSIS — E78.5 HYPERLIPIDEMIA, UNSPECIFIED: ICD-10-CM

## 2025-08-08 DIAGNOSIS — Z00.00 ENCOUNTER FOR GENERAL ADULT MEDICAL EXAMINATION W/OUT ABNORMAL FINDINGS: ICD-10-CM

## 2025-08-08 DIAGNOSIS — E03.8 OTHER SPECIFIED HYPOTHYROIDISM: ICD-10-CM

## 2025-08-08 DIAGNOSIS — C53.9 MALIGNANT NEOPLASM OF CERVIX UTERI, UNSPECIFIED: ICD-10-CM

## 2025-08-08 DIAGNOSIS — R73.01 IMPAIRED FASTING GLUCOSE: ICD-10-CM

## 2025-08-08 LAB — CYTOLOGY CVX/VAG DOC THIN PREP: NORMAL

## 2025-08-08 PROCEDURE — G0537: CPT

## 2025-08-08 PROCEDURE — 36415 COLL VENOUS BLD VENIPUNCTURE: CPT

## 2025-08-08 PROCEDURE — 93000 ELECTROCARDIOGRAM COMPLETE: CPT

## 2025-08-08 PROCEDURE — G0439: CPT

## 2025-08-09 LAB
ALBUMIN SERPL ELPH-MCNC: 4.4 G/DL
ALP BLD-CCNC: 84 U/L
ALT SERPL-CCNC: 17 U/L
ANION GAP SERPL CALC-SCNC: 12 MMOL/L
AST SERPL-CCNC: 23 U/L
BASOPHILS # BLD AUTO: 0.03 K/UL
BASOPHILS NFR BLD AUTO: 0.6 %
BILIRUB SERPL-MCNC: 0.3 MG/DL
BUN SERPL-MCNC: 23 MG/DL
CALCIUM SERPL-MCNC: 9.4 MG/DL
CHLORIDE SERPL-SCNC: 104 MMOL/L
CHOLEST SERPL-MCNC: 197 MG/DL
CO2 SERPL-SCNC: 24 MMOL/L
CREAT SERPL-MCNC: 0.79 MG/DL
EGFRCR SERPLBLD CKD-EPI 2021: 77 ML/MIN/1.73M2
EOSINOPHIL # BLD AUTO: 0.26 K/UL
EOSINOPHIL NFR BLD AUTO: 5.1 %
GLUCOSE SERPL-MCNC: 101 MG/DL
HCT VFR BLD CALC: 43.6 %
HDLC SERPL-MCNC: 45 MG/DL
HGB BLD-MCNC: 13.7 G/DL
IMM GRANULOCYTES NFR BLD AUTO: 0.2 %
LDLC SERPL-MCNC: 129 MG/DL
LYMPHOCYTES # BLD AUTO: 0.8 K/UL
LYMPHOCYTES NFR BLD AUTO: 15.7 %
MAN DIFF?: NORMAL
MCHC RBC-ENTMCNC: 29.5 PG
MCHC RBC-ENTMCNC: 31.4 G/DL
MCV RBC AUTO: 94 FL
MONOCYTES # BLD AUTO: 0.44 K/UL
MONOCYTES NFR BLD AUTO: 8.6 %
NEUTROPHILS # BLD AUTO: 3.56 K/UL
NEUTROPHILS NFR BLD AUTO: 69.8 %
NONHDLC SERPL-MCNC: 151 MG/DL
PLATELET # BLD AUTO: 173 K/UL
POTASSIUM SERPL-SCNC: 4 MMOL/L
PROT SERPL-MCNC: 6.7 G/DL
RBC # BLD: 4.64 M/UL
RBC # FLD: 14.6 %
SODIUM SERPL-SCNC: 141 MMOL/L
TRIGL SERPL-MCNC: 125 MG/DL
TSH SERPL-ACNC: 5.19 UIU/ML
WBC # FLD AUTO: 5.1 K/UL